# Patient Record
Sex: MALE | Race: OTHER | HISPANIC OR LATINO | URBAN - METROPOLITAN AREA
[De-identification: names, ages, dates, MRNs, and addresses within clinical notes are randomized per-mention and may not be internally consistent; named-entity substitution may affect disease eponyms.]

---

## 2018-01-11 ENCOUNTER — EMERGENCY (EMERGENCY)
Facility: HOSPITAL | Age: 28
LOS: 0 days | Discharge: HOME | End: 2018-01-12
Admitting: GENERAL PRACTICE
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
## 2018-01-11 DIAGNOSIS — Z79.82 LONG TERM (CURRENT) USE OF ASPIRIN: ICD-10-CM
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
## 2018-01-11 DIAGNOSIS — T31.0 BURNS INVOLVING LESS THAN 10% OF BODY SURFACE: ICD-10-CM

## 2018-01-11 DIAGNOSIS — T22.212A BURN OF SECOND DEGREE OF LEFT FOREARM, INITIAL ENCOUNTER: ICD-10-CM

## 2018-01-11 DIAGNOSIS — Z94.1 HEART TRANSPLANT STATUS: ICD-10-CM
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
## 2018-01-11 DIAGNOSIS — Z79.899 OTHER LONG TERM (CURRENT) DRUG THERAPY: ICD-10-CM

## 2018-01-11 DIAGNOSIS — Z79.52 LONG TERM (CURRENT) USE OF SYSTEMIC STEROIDS: ICD-10-CM
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
## 2018-05-22 ENCOUNTER — OUTPATIENT (OUTPATIENT)
Dept: OUTPATIENT SERVICES | Facility: HOSPITAL | Age: 28
LOS: 1 days | Discharge: HOME | End: 2018-05-22

## 2018-05-22 DIAGNOSIS — G35 MULTIPLE SCLEROSIS: ICD-10-CM

## 2018-07-31 ENCOUNTER — OUTPATIENT (OUTPATIENT)
Dept: OUTPATIENT SERVICES | Facility: HOSPITAL | Age: 28
LOS: 1 days | Discharge: HOME | End: 2018-07-31

## 2018-07-31 DIAGNOSIS — G35 MULTIPLE SCLEROSIS: ICD-10-CM

## 2019-06-22 ENCOUNTER — EMERGENCY (EMERGENCY)
Facility: HOSPITAL | Age: 29
LOS: 0 days | Discharge: HOME | End: 2019-06-22
Attending: EMERGENCY MEDICINE | Admitting: EMERGENCY MEDICINE
Payer: MEDICAID

## 2019-06-22 VITALS
RESPIRATION RATE: 18 BRPM | TEMPERATURE: 98 F | SYSTOLIC BLOOD PRESSURE: 110 MMHG | DIASTOLIC BLOOD PRESSURE: 70 MMHG | OXYGEN SATURATION: 97 % | HEART RATE: 96 BPM

## 2019-06-22 DIAGNOSIS — R10.9 UNSPECIFIED ABDOMINAL PAIN: ICD-10-CM

## 2019-06-22 DIAGNOSIS — Z94.1 HEART TRANSPLANT STATUS: Chronic | ICD-10-CM

## 2019-06-22 DIAGNOSIS — R19.7 DIARRHEA, UNSPECIFIED: ICD-10-CM

## 2019-06-22 LAB
ALBUMIN SERPL ELPH-MCNC: 4.1 G/DL — SIGNIFICANT CHANGE UP (ref 3.5–5.2)
ALP SERPL-CCNC: 61 U/L — SIGNIFICANT CHANGE UP (ref 30–115)
ALT FLD-CCNC: 42 U/L — HIGH (ref 0–41)
ANION GAP SERPL CALC-SCNC: 14 MMOL/L — SIGNIFICANT CHANGE UP (ref 7–14)
APPEARANCE UR: ABNORMAL
AST SERPL-CCNC: 48 U/L — HIGH (ref 0–41)
BASE EXCESS BLDV CALC-SCNC: 2.8 MMOL/L — HIGH (ref -2–2)
BASOPHILS # BLD AUTO: 0 K/UL — SIGNIFICANT CHANGE UP (ref 0–0.2)
BASOPHILS NFR BLD AUTO: 0 % — SIGNIFICANT CHANGE UP (ref 0–1)
BILIRUB SERPL-MCNC: 1.2 MG/DL — SIGNIFICANT CHANGE UP (ref 0.2–1.2)
BILIRUB UR-MCNC: ABNORMAL
BUN SERPL-MCNC: 13 MG/DL — SIGNIFICANT CHANGE UP (ref 10–20)
CA-I SERPL-SCNC: 1.19 MMOL/L — SIGNIFICANT CHANGE UP (ref 1.12–1.3)
CALCIUM SERPL-MCNC: 9.3 MG/DL — SIGNIFICANT CHANGE UP (ref 8.5–10.1)
CHLORIDE SERPL-SCNC: 103 MMOL/L — SIGNIFICANT CHANGE UP (ref 98–110)
CO2 SERPL-SCNC: 25 MMOL/L — SIGNIFICANT CHANGE UP (ref 17–32)
COLOR SPEC: ABNORMAL
CREAT SERPL-MCNC: <0.5 MG/DL — LOW (ref 0.7–1.5)
DIFF PNL FLD: ABNORMAL
EOSINOPHIL # BLD AUTO: 0.32 K/UL — SIGNIFICANT CHANGE UP (ref 0–0.7)
EOSINOPHIL NFR BLD AUTO: 4.4 % — SIGNIFICANT CHANGE UP (ref 0–8)
GAS PNL BLDV: 142 MMOL/L — SIGNIFICANT CHANGE UP (ref 136–145)
GAS PNL BLDV: SIGNIFICANT CHANGE UP
GLUCOSE SERPL-MCNC: 117 MG/DL — HIGH (ref 70–99)
GLUCOSE UR QL: NEGATIVE MG/DL — SIGNIFICANT CHANGE UP
HCO3 BLDV-SCNC: 27 MMOL/L — SIGNIFICANT CHANGE UP (ref 22–29)
HCT VFR BLD CALC: 47.1 % — SIGNIFICANT CHANGE UP (ref 42–52)
HCT VFR BLDA CALC: 48 % — HIGH (ref 34–44)
HGB BLD CALC-MCNC: 15.7 G/DL — SIGNIFICANT CHANGE UP (ref 14–18)
HGB BLD-MCNC: 15.3 G/DL — SIGNIFICANT CHANGE UP (ref 14–18)
IMM GRANULOCYTES NFR BLD AUTO: 0.4 % — HIGH (ref 0.1–0.3)
KETONES UR-MCNC: ABNORMAL
LACTATE BLDV-MCNC: 2 MMOL/L — HIGH (ref 0.5–1.6)
LEUKOCYTE ESTERASE UR-ACNC: NEGATIVE — SIGNIFICANT CHANGE UP
LYMPHOCYTES # BLD AUTO: 1.51 K/UL — SIGNIFICANT CHANGE UP (ref 1.2–3.4)
LYMPHOCYTES # BLD AUTO: 21 % — SIGNIFICANT CHANGE UP (ref 20.5–51.1)
MCHC RBC-ENTMCNC: 28.9 PG — SIGNIFICANT CHANGE UP (ref 27–31)
MCHC RBC-ENTMCNC: 32.5 G/DL — SIGNIFICANT CHANGE UP (ref 32–37)
MCV RBC AUTO: 89 FL — SIGNIFICANT CHANGE UP (ref 80–94)
MONOCYTES # BLD AUTO: 0.71 K/UL — HIGH (ref 0.1–0.6)
MONOCYTES NFR BLD AUTO: 9.9 % — HIGH (ref 1.7–9.3)
NEUTROPHILS # BLD AUTO: 4.63 K/UL — SIGNIFICANT CHANGE UP (ref 1.4–6.5)
NEUTROPHILS NFR BLD AUTO: 64.3 % — SIGNIFICANT CHANGE UP (ref 42.2–75.2)
NITRITE UR-MCNC: NEGATIVE — SIGNIFICANT CHANGE UP
NRBC # BLD: 0 /100 WBCS — SIGNIFICANT CHANGE UP (ref 0–0)
PCO2 BLDV: 41 MMHG — SIGNIFICANT CHANGE UP (ref 41–51)
PH BLDV: 7.43 — SIGNIFICANT CHANGE UP (ref 7.26–7.43)
PH UR: 5.5 — SIGNIFICANT CHANGE UP (ref 5–8)
PLATELET # BLD AUTO: 262 K/UL — SIGNIFICANT CHANGE UP (ref 130–400)
PO2 BLDV: 39 MMHG — SIGNIFICANT CHANGE UP (ref 20–40)
POTASSIUM BLDV-SCNC: 4 MMOL/L — SIGNIFICANT CHANGE UP (ref 3.3–5.6)
POTASSIUM SERPL-MCNC: 4.8 MMOL/L — SIGNIFICANT CHANGE UP (ref 3.5–5)
POTASSIUM SERPL-SCNC: 4.8 MMOL/L — SIGNIFICANT CHANGE UP (ref 3.5–5)
PROT SERPL-MCNC: 6.7 G/DL — SIGNIFICANT CHANGE UP (ref 6–8)
PROT UR-MCNC: 30 MG/DL
RBC # BLD: 5.29 M/UL — SIGNIFICANT CHANGE UP (ref 4.7–6.1)
RBC # FLD: 13.3 % — SIGNIFICANT CHANGE UP (ref 11.5–14.5)
RBC CASTS # UR COMP ASSIST: SIGNIFICANT CHANGE UP /HPF
SAO2 % BLDV: 71 % — SIGNIFICANT CHANGE UP
SODIUM SERPL-SCNC: 142 MMOL/L — SIGNIFICANT CHANGE UP (ref 135–146)
SP GR SPEC: 1.02 — SIGNIFICANT CHANGE UP (ref 1.01–1.03)
UROBILINOGEN FLD QL: 2 MG/DL (ref 0.2–0.2)
WBC # BLD: 7.2 K/UL — SIGNIFICANT CHANGE UP (ref 4.8–10.8)
WBC # FLD AUTO: 7.2 K/UL — SIGNIFICANT CHANGE UP (ref 4.8–10.8)

## 2019-06-22 PROCEDURE — 99284 EMERGENCY DEPT VISIT MOD MDM: CPT

## 2019-06-22 PROCEDURE — 93010 ELECTROCARDIOGRAM REPORT: CPT

## 2019-06-22 RX ORDER — SODIUM CHLORIDE 9 MG/ML
1000 INJECTION, SOLUTION INTRAVENOUS ONCE
Refills: 0 | Status: COMPLETED | OUTPATIENT
Start: 2019-06-22 | End: 2019-06-22

## 2019-06-22 RX ADMIN — SODIUM CHLORIDE 1000 MILLILITER(S): 9 INJECTION, SOLUTION INTRAVENOUS at 21:40

## 2019-06-22 RX ADMIN — SODIUM CHLORIDE 1000 MILLILITER(S): 9 INJECTION, SOLUTION INTRAVENOUS at 23:21

## 2019-06-22 NOTE — ED PROVIDER NOTE - OBJECTIVE STATEMENT
29 y/o M w/ PMHx Beckers Muscular Dystrophy, heart transplant (2010; on mycophenolate, tacrolimus) presents to the ED c/o intermittent fevers, chills, fatigue, nausea, and diarrhea for 5 weeks. Pt states that yesterday he had another episode of diarrhea described as loose stool and fatigue which prompted him to come to the ED today. He states that for the past week his brother has been complaining of diarrhea as well. He has otherwise been tolerating po. Pt denies abd pain, dysuria, hematuria, hematochezia, back pain, chest pain, SOB, recent travel, recent Abx use, or any other acute complaints. 29 y/o M w/ PMHx Beckers Muscular Dystrophy, heart transplant (2010; on mycophenolate, tacrolimus) presents to the ED c/o intermittent fevers, chills, fatigue, nausea, and diarrhea for 5 weeks. Pt states that yesterday he had an episode of diarrhea and fatigue which prompted him to come to the ED today. He states that for the past week his brother has been complaining of diarrhea as well. He has otherwise been tolerating po. Pt denies abd pain, dysuria, hematuria, hematochezia, back pain, chest pain, SOB, recent travel, recent Abx use.

## 2019-06-22 NOTE — ED PROVIDER NOTE - NS ED ROS FT
Constitutional: (+) fever, (+) chills, (+) fatigue  Eyes/ENT: (-) blurry vision, (-) epistaxis  Cardiovascular: (-) chest pain, (-) syncope  Respiratory: (-) cough, (-) shortness of breath  Gastrointestinal: (+) nausea, (-) vomiting, (+) diarrhea, (-) hematochezia  Genitourinary: (-) dysuria, (-) urgency, (-) hematuria  Musculoskeletal: (-) back pain, (-) joint pain  Integumentary: (-) rash, (-) edema  Neurological: (-) headache  Allergic/Immunologic: (-) pruritus Constitutional: (+) fever, (+) chills, (+) fatigue  Eyes/ENT: (-) eye discharge, (-) epistaxis  Cardiovascular: (-) chest pain, (-) syncope  Respiratory: (-) cough, (-) shortness of breath  Gastrointestinal: (+) nausea, (-) vomiting, (+) diarrhea, (-) hematochezia  Genitourinary: (-) dysuria, (-) urgency, (-) hematuria  Musculoskeletal: (-) back pain, (-) joint pain  Integumentary: (-) edema  Neurological: (-) headache  Allergic/Immunologic: (-) pruritus

## 2019-06-22 NOTE — ED ADULT NURSE NOTE - OBJECTIVE STATEMENT
Patient presents with lower abdominal pain, nausea, and diarrhea. Patient states that he has been having diarrhea for the past 5 weeks on and off. Hx of muscular dystrophy and heart transplant.

## 2019-06-22 NOTE — ED PROVIDER NOTE - CLINICAL SUMMARY MEDICAL DECISION MAKING FREE TEXT BOX
VS reviewed. Patient extremely well appearing.  Patient has absolutely no abdominal tenderness.  He is afebrile. IV placed and labs sent. Patient given fluids and GI meds.  He is tolerating PO. ED work up reviewed.  I do not think patient requires a Ct scan at this time as he is completely non-tender and labs unremarkable. Patient has very close follow up and able to return with any changes in status. Strict return precautions discussed.    Full DC instructions discussed and patient knows when to seek immediate medical attention.  Patient has proper follow up.  All results discussed and patient aware they may require further work up.  Proper follow up ensured. Limitations of ED work up discussed.  Medications administered and prescribed/OTC home meds discussed.  All questions and concerns from patient or family addressed. Understanding of instructions verbalized.

## 2019-06-22 NOTE — ED PROVIDER NOTE - NSFOLLOWUPINSTRUCTIONS_ED_ALL_ED_FT
Follow up with your primary care doctor, transplant specialist and/or the doctors recommended in 1-3 days     Diarrhea    Diarrhea is frequent loose or watery bowel movements that has many causes. Diarrhea can make you feel weak and cause you to become dehydrated. Diarrhea typically lasts 2–3 days, but can last longer if it is a sign of something more serious. Drink clear fluids to prevent dehydration. Eat bland, easy-to-digest foods as tolerated.     SEEK IMMEDIATE MEDICAL CARE IF YOU HAVE ANY OF THE FOLLOWING SYMPTOMS: high fevers, lightheadedness/dizziness, chest pain, black or bloody stools, shortness of breath, severe abdominal or back pain, or any signs of dehydration.

## 2019-06-22 NOTE — ED PROVIDER NOTE - PHYSICAL EXAMINATION
Vital Signs: I have reviewed the initial vital signs.  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  Constitutional: appears stated age, no acute distress, sitting in wheelchair  EYES: PERRL, EOM, no conjunctival erythema  EENT: dry mucous membranes, no nasal discharge  Cardiovascular: regular rate, regular rhythm, well-perfused extremities +s1,s2  Respiratory: unlabored respiratory effort, CTABL  Gastrointestinal: soft, ntnd abdomen, no pulsatile mass, no rebound, no guarding, no rigidity   Musculoskeletal: no lower extremity edema  Integumentary: warm, dry, no rash  Neurologic AOx 3, poor neurologic status chronic 2/2 dm  Psychiatric: appropriate mood, appropriate affect

## 2019-06-22 NOTE — ED PROVIDER NOTE - ATTENDING CONTRIBUTION TO CARE
I personally evaluated patient. I agree with the findings and plan with all documentation on chart except as documented  in my note.    29 y/o M w/ PMHx Beckers Muscular Dystrophy, heart transplant (2010; on mycophenolate, tacrolimus) presents to the ED c/o intermittent fevers, chills, fatigue, nausea, and diarrhea for 5 weeks. Pt states that yesterday he had an episode of diarrhea and fatigue which prompted him to come to the ED today. He states that for the past week his brother has been complaining of diarrhea as well. He has otherwise been tolerating po. Pt denies abd pain, dysuria, hematuria, hematochezia, back pain, chest pain, SOB, recent travel, recent Abx use.    VS reviewed. Patient extremely well appearing.  Patient has absolutely no abdominal tenderness.  He is afebrile. IV placed and labs sent. Patient given fluids and GI meds.  He is tolerating PO. ED work up reviewed.  I do not think patient requires a Ct scan at this time as he is completely non-tender and labs unremarkable. Patient has very close follow up and able to return with any changes in status. Strict return precautions discussed.    Full DC instructions discussed and patient knows when to seek immediate medical attention.  Patient has proper follow up.  All results discussed and patient aware they may require further work up.  Proper follow up ensured. Limitations of ED work up discussed.  Medications administered and prescribed/OTC home meds discussed.  All questions and concerns from patient or family addressed. Understanding of instructions verbalized. I agree with both resident and medical student documentation.    I personally evaluated patient. I agree with the findings and plan with all documentation on chart except as documented  in my note.    29 y/o M w/ PMHx Beckers Muscular Dystrophy, heart transplant (2010; on mycophenolate, tacrolimus) presents to the ED c/o intermittent fevers, chills, fatigue, nausea, and diarrhea for 5 weeks. Pt states that yesterday he had an episode of diarrhea and fatigue which prompted him to come to the ED today. He states that for the past week his brother has been complaining of diarrhea as well. He has otherwise been tolerating po. Pt denies abd pain, dysuria, hematuria, hematochezia, back pain, chest pain, SOB, recent travel, recent Abx use.    VS reviewed. Patient extremely well appearing.  Patient has absolutely no abdominal tenderness.  He is afebrile. IV placed and labs sent. Patient given fluids and GI meds.  He is tolerating PO. ED work up reviewed.  I do not think patient requires a Ct scan at this time as he is completely non-tender and labs unremarkable. Patient has very close follow up and able to return with any changes in status. Strict return precautions discussed.    Full DC instructions discussed and patient knows when to seek immediate medical attention.  Patient has proper follow up.  All results discussed and patient aware they may require further work up.  Proper follow up ensured. Limitations of ED work up discussed.  Medications administered and prescribed/OTC home meds discussed.  All questions and concerns from patient or family addressed. Understanding of instructions verbalized.

## 2019-06-22 NOTE — ED PROVIDER NOTE - PROGRESS NOTE DETAILS
zw sdm with patient, no ct at this time, understands return pecautions. pt reevaluated, feels better. discussed return precautions and need for close follow up with pmd and transplant doctor. Patient to be discharged from ED. Any available test results were discussed with patient and/or family. Verbal instructions given, including instructions to return to ED immediately for any new, worsening, or concerning symptoms. Patient endorsed understanding. Written discharge instructions additionally given, including follow-up plan.

## 2019-06-27 ENCOUNTER — INPATIENT (INPATIENT)
Facility: HOSPITAL | Age: 29
LOS: 1 days | Discharge: HOME | End: 2019-06-29
Attending: INTERNAL MEDICINE | Admitting: INTERNAL MEDICINE
Payer: MEDICAID

## 2019-06-27 VITALS
WEIGHT: 149.91 LBS | HEART RATE: 77 BPM | TEMPERATURE: 96 F | OXYGEN SATURATION: 99 % | RESPIRATION RATE: 18 BRPM | DIASTOLIC BLOOD PRESSURE: 61 MMHG | SYSTOLIC BLOOD PRESSURE: 100 MMHG

## 2019-06-27 DIAGNOSIS — R53.1 WEAKNESS: ICD-10-CM

## 2019-06-27 DIAGNOSIS — M79.10 MYALGIA, UNSPECIFIED SITE: ICD-10-CM

## 2019-06-27 DIAGNOSIS — R07.9 CHEST PAIN, UNSPECIFIED: ICD-10-CM

## 2019-06-27 DIAGNOSIS — G71.01 DUCHENNE OR BECKER MUSCULAR DYSTROPHY: ICD-10-CM

## 2019-06-27 DIAGNOSIS — F12.90 CANNABIS USE, UNSPECIFIED, UNCOMPLICATED: ICD-10-CM

## 2019-06-27 DIAGNOSIS — T43.95XA ADVERSE EFFECT OF UNSPECIFIED PSYCHOTROPIC DRUG, INITIAL ENCOUNTER: ICD-10-CM

## 2019-06-27 DIAGNOSIS — F17.210 NICOTINE DEPENDENCE, CIGARETTES, UNCOMPLICATED: ICD-10-CM

## 2019-06-27 DIAGNOSIS — Z79.82 LONG TERM (CURRENT) USE OF ASPIRIN: ICD-10-CM

## 2019-06-27 DIAGNOSIS — B34.9 VIRAL INFECTION, UNSPECIFIED: ICD-10-CM

## 2019-06-27 DIAGNOSIS — Z79.899 OTHER LONG TERM (CURRENT) DRUG THERAPY: ICD-10-CM

## 2019-06-27 DIAGNOSIS — Z94.1 HEART TRANSPLANT STATUS: ICD-10-CM

## 2019-06-27 DIAGNOSIS — R18.8 OTHER ASCITES: ICD-10-CM

## 2019-06-27 DIAGNOSIS — Z94.1 HEART TRANSPLANT STATUS: Chronic | ICD-10-CM

## 2019-06-27 DIAGNOSIS — F32.9 MAJOR DEPRESSIVE DISORDER, SINGLE EPISODE, UNSPECIFIED: ICD-10-CM

## 2019-06-27 DIAGNOSIS — K82.8 OTHER SPECIFIED DISEASES OF GALLBLADDER: ICD-10-CM

## 2019-06-27 DIAGNOSIS — R74.0 NONSPECIFIC ELEVATION OF LEVELS OF TRANSAMINASE AND LACTIC ACID DEHYDROGENASE [LDH]: ICD-10-CM

## 2019-06-27 DIAGNOSIS — R11.0 NAUSEA: ICD-10-CM

## 2019-06-27 LAB
ALBUMIN SERPL ELPH-MCNC: 4 G/DL — SIGNIFICANT CHANGE UP (ref 3.5–5.2)
ALP SERPL-CCNC: 54 U/L — SIGNIFICANT CHANGE UP (ref 30–115)
ALT FLD-CCNC: 44 U/L — HIGH (ref 0–41)
ANION GAP SERPL CALC-SCNC: 17 MMOL/L — HIGH (ref 7–14)
AST SERPL-CCNC: 68 U/L — HIGH (ref 0–41)
BASE EXCESS BLDV CALC-SCNC: -1.7 MMOL/L — SIGNIFICANT CHANGE UP (ref -2–2)
BILIRUB SERPL-MCNC: 1.3 MG/DL — HIGH (ref 0.2–1.2)
BUN SERPL-MCNC: 20 MG/DL — SIGNIFICANT CHANGE UP (ref 10–20)
CA-I SERPL-SCNC: 1.12 MMOL/L — SIGNIFICANT CHANGE UP (ref 1.12–1.3)
CALCIUM SERPL-MCNC: 9.8 MG/DL — SIGNIFICANT CHANGE UP (ref 8.5–10.1)
CHLORIDE SERPL-SCNC: 97 MMOL/L — LOW (ref 98–110)
CK MB CFR SERPL CALC: 45.6 NG/ML — HIGH (ref 0.6–6.3)
CK SERPL-CCNC: 1826 U/L — HIGH (ref 0–225)
CK SERPL-CCNC: 2339 U/L — HIGH (ref 0–225)
CO2 SERPL-SCNC: 23 MMOL/L — SIGNIFICANT CHANGE UP (ref 17–32)
CREAT SERPL-MCNC: <0.5 MG/DL — LOW (ref 0.7–1.5)
GAS PNL BLDV: 136 MMOL/L — SIGNIFICANT CHANGE UP (ref 136–145)
GAS PNL BLDV: SIGNIFICANT CHANGE UP
GLUCOSE SERPL-MCNC: 101 MG/DL — HIGH (ref 70–99)
HCO3 BLDV-SCNC: 24 MMOL/L — SIGNIFICANT CHANGE UP (ref 22–29)
HCT VFR BLD CALC: 43.5 % — SIGNIFICANT CHANGE UP (ref 42–52)
HCT VFR BLDA CALC: 45.4 % — HIGH (ref 34–44)
HGB BLD CALC-MCNC: 14.8 G/DL — SIGNIFICANT CHANGE UP (ref 14–18)
HGB BLD-MCNC: 14.1 G/DL — SIGNIFICANT CHANGE UP (ref 14–18)
INR BLD: 1.43 RATIO — HIGH (ref 0.65–1.3)
LACTATE BLDV-MCNC: 1.9 MMOL/L — HIGH (ref 0.5–1.6)
LACTATE SERPL-SCNC: 2.8 MMOL/L — HIGH (ref 0.5–2.2)
LIDOCAIN IGE QN: 11 U/L — SIGNIFICANT CHANGE UP (ref 7–60)
MAGNESIUM SERPL-MCNC: 1.5 MG/DL — LOW (ref 1.8–2.4)
MCHC RBC-ENTMCNC: 28.7 PG — SIGNIFICANT CHANGE UP (ref 27–31)
MCHC RBC-ENTMCNC: 32.4 G/DL — SIGNIFICANT CHANGE UP (ref 32–37)
MCV RBC AUTO: 88.4 FL — SIGNIFICANT CHANGE UP (ref 80–94)
NRBC # BLD: 0 /100 WBCS — SIGNIFICANT CHANGE UP (ref 0–0)
NT-PROBNP SERPL-SCNC: 3464 PG/ML — HIGH (ref 0–300)
PCO2 BLDV: 41 MMHG — SIGNIFICANT CHANGE UP (ref 41–51)
PH BLDV: 7.37 — SIGNIFICANT CHANGE UP (ref 7.26–7.43)
PLATELET # BLD AUTO: 260 K/UL — SIGNIFICANT CHANGE UP (ref 130–400)
PO2 BLDV: 34 MMHG — SIGNIFICANT CHANGE UP (ref 20–40)
POTASSIUM BLDV-SCNC: 3.9 MMOL/L — SIGNIFICANT CHANGE UP (ref 3.3–5.6)
POTASSIUM SERPL-MCNC: 4.4 MMOL/L — SIGNIFICANT CHANGE UP (ref 3.5–5)
POTASSIUM SERPL-SCNC: 4.4 MMOL/L — SIGNIFICANT CHANGE UP (ref 3.5–5)
PROT SERPL-MCNC: 6.4 G/DL — SIGNIFICANT CHANGE UP (ref 6–8)
PROTHROM AB SERPL-ACNC: 16.4 SEC — HIGH (ref 9.95–12.87)
RBC # BLD: 4.92 M/UL — SIGNIFICANT CHANGE UP (ref 4.7–6.1)
RBC # FLD: 13.6 % — SIGNIFICANT CHANGE UP (ref 11.5–14.5)
SAO2 % BLDV: 55 % — SIGNIFICANT CHANGE UP
SODIUM SERPL-SCNC: 137 MMOL/L — SIGNIFICANT CHANGE UP (ref 135–146)
TROPONIN T SERPL-MCNC: 0.39 NG/ML — CRITICAL HIGH
TROPONIN T SERPL-MCNC: 0.47 NG/ML — CRITICAL HIGH
WBC # BLD: 9.51 K/UL — SIGNIFICANT CHANGE UP (ref 4.8–10.8)
WBC # FLD AUTO: 9.51 K/UL — SIGNIFICANT CHANGE UP (ref 4.8–10.8)

## 2019-06-27 PROCEDURE — 99291 CRITICAL CARE FIRST HOUR: CPT

## 2019-06-27 PROCEDURE — 71045 X-RAY EXAM CHEST 1 VIEW: CPT | Mod: 26

## 2019-06-27 PROCEDURE — 93010 ELECTROCARDIOGRAM REPORT: CPT | Mod: 77

## 2019-06-27 PROCEDURE — 99222 1ST HOSP IP/OBS MODERATE 55: CPT

## 2019-06-27 PROCEDURE — 93010 ELECTROCARDIOGRAM REPORT: CPT

## 2019-06-27 PROCEDURE — 76705 ECHO EXAM OF ABDOMEN: CPT | Mod: 26

## 2019-06-27 RX ORDER — MYCOPHENOLATE MOFETIL 250 MG/1
3 CAPSULE ORAL
Qty: 0 | Refills: 0 | DISCHARGE

## 2019-06-27 RX ORDER — ESCITALOPRAM OXALATE 10 MG/1
10 TABLET, FILM COATED ORAL DAILY
Refills: 0 | Status: DISCONTINUED | OUTPATIENT
Start: 2019-06-27 | End: 2019-06-28

## 2019-06-27 RX ORDER — ERGOCALCIFEROL 1.25 MG/1
50000 CAPSULE ORAL
Refills: 0 | Status: DISCONTINUED | OUTPATIENT
Start: 2019-06-27 | End: 2019-06-29

## 2019-06-27 RX ORDER — MAGNESIUM SULFATE 500 MG/ML
2 VIAL (ML) INJECTION ONCE
Refills: 0 | Status: COMPLETED | OUTPATIENT
Start: 2019-06-27 | End: 2019-06-27

## 2019-06-27 RX ORDER — PANTOPRAZOLE SODIUM 20 MG/1
40 TABLET, DELAYED RELEASE ORAL
Refills: 0 | Status: DISCONTINUED | OUTPATIENT
Start: 2019-06-27 | End: 2019-06-29

## 2019-06-27 RX ORDER — ASPIRIN/CALCIUM CARB/MAGNESIUM 324 MG
1 TABLET ORAL
Qty: 0 | Refills: 0 | DISCHARGE

## 2019-06-27 RX ORDER — TACROLIMUS 5 MG/1
3 CAPSULE ORAL
Qty: 0 | Refills: 0 | DISCHARGE

## 2019-06-27 RX ORDER — ASPIRIN/CALCIUM CARB/MAGNESIUM 324 MG
81 TABLET ORAL DAILY
Refills: 0 | Status: DISCONTINUED | OUTPATIENT
Start: 2019-06-27 | End: 2019-06-29

## 2019-06-27 RX ORDER — TACROLIMUS 5 MG/1
4 CAPSULE ORAL
Qty: 0 | Refills: 0 | DISCHARGE

## 2019-06-27 RX ORDER — TACROLIMUS 5 MG/1
0.5 CAPSULE ORAL AT BEDTIME
Refills: 0 | Status: DISCONTINUED | OUTPATIENT
Start: 2019-06-27 | End: 2019-06-29

## 2019-06-27 RX ORDER — MYCOPHENOLATE MOFETIL 250 MG/1
1 CAPSULE ORAL
Qty: 0 | Refills: 0 | DISCHARGE

## 2019-06-27 RX ORDER — MYCOPHENOLATE MOFETIL 250 MG/1
750 CAPSULE ORAL AT BEDTIME
Refills: 0 | Status: DISCONTINUED | OUTPATIENT
Start: 2019-06-27 | End: 2019-06-29

## 2019-06-27 RX ORDER — PANTOPRAZOLE SODIUM 20 MG/1
30 TABLET, DELAYED RELEASE ORAL
Refills: 0 | Status: DISCONTINUED | OUTPATIENT
Start: 2019-06-27 | End: 2019-06-27

## 2019-06-27 RX ORDER — ENOXAPARIN SODIUM 100 MG/ML
40 INJECTION SUBCUTANEOUS AT BEDTIME
Refills: 0 | Status: DISCONTINUED | OUTPATIENT
Start: 2019-06-27 | End: 2019-06-29

## 2019-06-27 RX ORDER — ATENOLOL 25 MG/1
1 TABLET ORAL
Qty: 0 | Refills: 0 | DISCHARGE

## 2019-06-27 RX ORDER — ATENOLOL 25 MG/1
25 TABLET ORAL DAILY
Refills: 0 | Status: DISCONTINUED | OUTPATIENT
Start: 2019-06-27 | End: 2019-06-29

## 2019-06-27 RX ORDER — CHLORHEXIDINE GLUCONATE 213 G/1000ML
1 SOLUTION TOPICAL
Refills: 0 | Status: DISCONTINUED | OUTPATIENT
Start: 2019-06-27 | End: 2019-06-29

## 2019-06-27 RX ORDER — ONDANSETRON 8 MG/1
4 TABLET, FILM COATED ORAL EVERY 8 HOURS
Refills: 0 | Status: DISCONTINUED | OUTPATIENT
Start: 2019-06-27 | End: 2019-06-27

## 2019-06-27 RX ORDER — MYCOPHENOLATE MOFETIL 250 MG/1
500 CAPSULE ORAL
Refills: 0 | Status: DISCONTINUED | OUTPATIENT
Start: 2019-06-27 | End: 2019-06-29

## 2019-06-27 RX ORDER — ONDANSETRON 8 MG/1
4 TABLET, FILM COATED ORAL ONCE
Refills: 0 | Status: COMPLETED | OUTPATIENT
Start: 2019-06-27 | End: 2019-06-27

## 2019-06-27 RX ORDER — TACROLIMUS 5 MG/1
4 CAPSULE ORAL
Refills: 0 | Status: DISCONTINUED | OUTPATIENT
Start: 2019-06-27 | End: 2019-06-29

## 2019-06-27 RX ORDER — MYCOPHENOLATE MOFETIL 250 MG/1
2 CAPSULE ORAL
Qty: 0 | Refills: 0 | DISCHARGE

## 2019-06-27 RX ORDER — TACROLIMUS 5 MG/1
3 CAPSULE ORAL AT BEDTIME
Refills: 0 | Status: DISCONTINUED | OUTPATIENT
Start: 2019-06-27 | End: 2019-06-29

## 2019-06-27 RX ORDER — UBIDECARENONE 100 MG
1 CAPSULE ORAL
Qty: 0 | Refills: 0 | DISCHARGE

## 2019-06-27 RX ORDER — SODIUM CHLORIDE 9 MG/ML
1000 INJECTION INTRAMUSCULAR; INTRAVENOUS; SUBCUTANEOUS
Refills: 0 | Status: DISCONTINUED | OUTPATIENT
Start: 2019-06-27 | End: 2019-06-29

## 2019-06-27 RX ORDER — SODIUM CHLORIDE 9 MG/ML
1000 INJECTION INTRAMUSCULAR; INTRAVENOUS; SUBCUTANEOUS ONCE
Refills: 0 | Status: COMPLETED | OUTPATIENT
Start: 2019-06-27 | End: 2019-06-27

## 2019-06-27 RX ORDER — ASPIRIN/CALCIUM CARB/MAGNESIUM 324 MG
325 TABLET ORAL ONCE
Refills: 0 | Status: COMPLETED | OUTPATIENT
Start: 2019-06-27 | End: 2019-06-27

## 2019-06-27 RX ORDER — TACROLIMUS 5 MG/1
0.5 CAPSULE ORAL
Qty: 0 | Refills: 0 | DISCHARGE

## 2019-06-27 RX ORDER — ACETAMINOPHEN 500 MG
2 TABLET ORAL
Qty: 0 | Refills: 0 | DISCHARGE

## 2019-06-27 RX ORDER — ACETAMINOPHEN 500 MG
500 TABLET ORAL EVERY 8 HOURS
Refills: 0 | Status: DISCONTINUED | OUTPATIENT
Start: 2019-06-27 | End: 2019-06-29

## 2019-06-27 RX ORDER — ERGOCALCIFEROL 1.25 MG/1
1 CAPSULE ORAL
Qty: 0 | Refills: 0 | DISCHARGE

## 2019-06-27 RX ADMIN — Medication 2 GRAM(S): at 07:27

## 2019-06-27 RX ADMIN — ONDANSETRON 4 MILLIGRAM(S): 8 TABLET, FILM COATED ORAL at 04:20

## 2019-06-27 RX ADMIN — TACROLIMUS 4 MILLIGRAM(S): 5 CAPSULE ORAL at 12:26

## 2019-06-27 RX ADMIN — TACROLIMUS 3 MILLIGRAM(S): 5 CAPSULE ORAL at 21:37

## 2019-06-27 RX ADMIN — MYCOPHENOLATE MOFETIL 750 MILLIGRAM(S): 250 CAPSULE ORAL at 21:38

## 2019-06-27 RX ADMIN — Medication 50 GRAM(S): at 05:48

## 2019-06-27 RX ADMIN — SODIUM CHLORIDE 1000 MILLILITER(S): 9 INJECTION INTRAMUSCULAR; INTRAVENOUS; SUBCUTANEOUS at 05:55

## 2019-06-27 RX ADMIN — MYCOPHENOLATE MOFETIL 500 MILLIGRAM(S): 250 CAPSULE ORAL at 12:26

## 2019-06-27 RX ADMIN — Medication 81 MILLIGRAM(S): at 12:26

## 2019-06-27 RX ADMIN — ENOXAPARIN SODIUM 40 MILLIGRAM(S): 100 INJECTION SUBCUTANEOUS at 21:38

## 2019-06-27 RX ADMIN — SODIUM CHLORIDE 100 MILLILITER(S): 9 INJECTION INTRAMUSCULAR; INTRAVENOUS; SUBCUTANEOUS at 14:12

## 2019-06-27 RX ADMIN — TACROLIMUS 0.5 MILLIGRAM(S): 5 CAPSULE ORAL at 21:38

## 2019-06-27 RX ADMIN — PANTOPRAZOLE SODIUM 40 MILLIGRAM(S): 20 TABLET, DELAYED RELEASE ORAL at 12:27

## 2019-06-27 RX ADMIN — SODIUM CHLORIDE 1000 MILLILITER(S): 9 INJECTION INTRAMUSCULAR; INTRAVENOUS; SUBCUTANEOUS at 03:55

## 2019-06-27 RX ADMIN — Medication 325 MILLIGRAM(S): at 07:41

## 2019-06-27 RX ADMIN — ESCITALOPRAM OXALATE 10 MILLIGRAM(S): 10 TABLET, FILM COATED ORAL at 21:37

## 2019-06-27 NOTE — ED PROVIDER NOTE - NS ED ROS FT
Constitutional: See HPI.  Eyes: No visual changes,   ENMT:  No neck pain  Cardiac: see hpi  Respiratory: No cough o  GI: No nausea, vomiting,+ diarrhea no abdominal pain.  : No dysuria, frequency or burning.   MS: +myalgias  Psych: No suicidal or homicidal ideations.  Neuro: No headache   Skin: No skin rash.

## 2019-06-27 NOTE — CHART NOTE - NSCHARTNOTEFT_GEN_A_CORE
Called by Pharmacy regarding possible drug interaction between Lexapro and tacrolimus affecting the liver. Endorsed to resident on call to clarify length of time patient had been on both medications and to possibly change the Lexapro to alternate agent for patient's depression. Tacrolimus levels ordered for the AM. Called by Pharmacy regarding possible drug interaction between Lexapro and tacrolimus affecting the liver. Endorsed to resident on call to clarify length of time patient had been on both medications and to possibly change the Lexapro to alternate agent for patient's depression. Tacrolimus levels collected; pending.

## 2019-06-27 NOTE — H&P ADULT - HISTORY OF PRESENT ILLNESS
This is a 27 yo M w PMH significant for Beckers Muscular Dystrophy s/p heart transplant 2010 ([place]) and depression presenting to the emergency department for a 2 week history of intermittent fever, generalized weakness and malaise. Patient's symptoms all began around the same time; states his last fever was two days ago, and he feels tired without much energy. Since his transplant, he has never experienced these symptoms. He also endorses nausea, decreased appetite, nonbloody diarrhea, dizziness. On arrival, patient states there was a burning in his epigastric/sternal area, without radiation to neck or arm, and also complained of SOB at rest on arrival. Patient denies coughing, wheezing, palpitations, abdominal pain, or vomiting. No travel history or sick contacts; compliant with medications and states the only new change in medication treatment is the addition of escitalopram in March. Patient was here in the ED this Saturday for the same complaints and was d/c to home at that time.      In the ED, vital signs: HR  77, /61, T 96.4, SpO2 99% RA, RR 18. CBC CMP ordered. Trops 0.47. EKG performed:  Normal sinus rhythm, Rightward axis, Incomplete right bundle branch block, Nonspecific ST and T wave abnormality. Cardio consult was placed. This is a 27 yo M w PMH significant for Beckers Muscular Dystrophy s/p heart transplant 2010 ([place]) and depression presenting to the emergency department for a 2 week history of intermittent fever, generalized weakness and malaise. Patient's symptoms all began around the same time; states his last fever was two days ago, and he feels tired without much energy. Since his transplant, he has never experienced these symptoms. He also endorses nausea, decreased appetite, nonbloody diarrhea, dizziness. On arrival, patient states there was a burning in his epigastric/sternal area, without radiation to neck or arm, and also complained of SOB at rest on arrival. Patient denies coughing, wheezing, palpitations, abdominal pain, or vomiting. No travel history or sick contacts; compliant with medications and states the only new change in medication treatment is the addition of escitalopram in March. Patient was here in the ED this Saturday for the same complaints and was d/c to home at that time.      In the ED, vital signs: HR  77, /61, T 96.4, SpO2 99% RA, RR 18. CBC CMP ordered. Trops 0.47. EKG performed:  Normal sinus rhythm, Rightward axis, Incomplete right bundle branch block, Nonspecific ST and T wave abnormality. Cardio consult was placed.

## 2019-06-27 NOTE — SBIRT NOTE ADULT - NSSBIRTALCPOSREINDET_GEN_A_CORE
Reinforced positive choices and educated patient about NI[employer] low-risk drinking level and risks/harm reduction around excessive alcohol use.

## 2019-06-27 NOTE — ED PROVIDER NOTE - CLINICAL SUMMARY MEDICAL DECISION MAKING FREE TEXT BOX
Pt with hx of heart transplant with complaints of CP. EKG w/o acute changes. Trop elevated. Discussed with cardiology fellow who recommended admission to telemetry for further managment

## 2019-06-27 NOTE — ED PROVIDER NOTE - OBJECTIVE STATEMENT
28 year old male w/ a pmh of beckers muscular dystrophy s/p heart transplant in 2016 on tacrolimus prednisone and mycophenolate  & depression on lexapro presents here for multiple complaints. Patient has been having diarrhea once a day for the last 2 weeks endorses feeling dehydrated with body aches. Patient also has been having intermittent sob and cp x 1 week. No recent travel no recent surgeries, cardiologist is in [facility] next appointment is july 30 2019. Denies any fever cough abdominal pain urinary frequency urgency burning leg pain/swelling. 28 year old male w/ a pmh of beckers muscular dystrophy s/p heart transplant in 2010 on tacrolimus prednisone and mycophenolate  & depression on lexapro presents here for multiple complaints. Patient has been having diarrhea once a day for the last 2 weeks endorses feeling dehydrated with body aches. Patient also has been having intermittent sob and cp x 1 week. No recent travel no recent surgeries, cardiologist is in [facility] next appointment is july 30 2019. Denies any fever cough abdominal pain urinary frequency urgency burning leg pain/swelling.

## 2019-06-27 NOTE — ED ADULT NURSE NOTE - OBJECTIVE STATEMENT
pt came to ED reporting diarrhea and feeling "dehydrated" for 2 weeks, intermittent SOB and chest pain. placed on cardiac monitor

## 2019-06-27 NOTE — ED PROVIDER NOTE - ATTENDING CONTRIBUTION TO CARE
I personally evaluated the patient. I reviewed the Resident’s or Physician Assistant’s note (as assigned above), and agree with the findings and plan except as documented in my note.  Pt with hx of Vincent's muscular dystrophy, hx of heart transplant in 2010 with complaints of CP and SOB. CP is constant, no aggravating or alleviating factors. Pt also report diarrhea for the past 2 weeks. Associated with myalgias. No rhinorrhea, congestion, sore throat, nausea, vomiting. VS reviewed, pt well appearing, NAD. Head ncat, neck supple, no JVD, normal s1s2 without any murmurs, Lungs CTAB with normal work of breathing. abd +BS, s/nd/nt, extremities wnl, AAO x 3, CN II to XII wnl, motor strength in b/l UE 4/5 and 3/5 in b/l LE, no acute skin rashes. Plan is EKG, labs, monitor, cardiology consult, ASA and manage accordingly.

## 2019-06-27 NOTE — H&P ADULT - NSHPLABSRESULTS_GEN_ALL_CORE
14.1   9.51  )-----------( 260      ( 27 Jun 2019 04:00 )             43.5   06-27    137  |  97<L>  |  20  ----------------------------<  101<H>  4.4   |  23  |  <0.5<L>    Ca    9.8      27 Jun 2019 04:00  Mg     1.5     06-27    TPro  6.4  /  Alb  4.0  /  TBili  1.3<H>  /  DBili  x   /  AST  68<H>  /  ALT  44<H>  /  AlkPhos  54  06-27 14.1   9.51  )-----------( 260      ( 27 Jun 2019 04:00 )             43.5   06-27    137  |  97<L>  |  20  ----------------------------<  101<H>  4.4   |  23  |  <0.5<L>    Ca    9.8      27 Jun 2019 04:00  Mg     1.5     06-27    TPro  6.4  /  Alb  4.0  /  TBili  1.3<H>  /  DBili  x   /  AST  68<H>  /  ALT  44<H>  /  AlkPhos  54  06-27    Troponin T, Serum (06.27.19 @ 04:00)    Troponin T, Serum: 0.47

## 2019-06-27 NOTE — H&P ADULT - ASSESSMENT
This is a 29 yo M w PMH significant for Beckers Muscular Dystrophy s/p heart transplant 2010 ([place]) and depression presenting to the emergency department for a 2 week history of intermittent fever, generalized weakness and malaise.    #) Fever, weakness, diarrhea  - infectious vs. medication side effect  - Afebrile since yesterday; continue to monitor  - No leukocytosis present - continue to monitor  - Blood cx ordered  - Compliant with medications   - ID consult ordered    #) Elevated troponins  - No ACS  - Less likely rejection as pt has been compliant  - Admit to tele for 24 hr  - 0.47 troponin - continue to trend   - CKMB ordered  - FU tacrolimus and mycophenolate levels  - 2d echo  - IV fluids   - Follow up with transplant physician at [place]     #)Mild transaminitis  - RUQ US ordered  - Continue to follow     #) Depression  - Continue escitalopram    #) DVT ppx  -Lovenox ordered    #) GI ppx  -Not indicated    #) Dispo  - Arrived from home; d/c to home when medically stable    #) Code status  - Full code This is a 29 yo M w PMH significant for Beckers Muscular Dystrophy s/p heart transplant 2010 ([place]) and depression presenting to the emergency department for a 2 week history of intermittent fever, generalized weakness and malaise.    #) Fever, weakness, diarrhea  - infectious vs. medication side effect  - Zofran prn  - Afebrile since yesterday; continue to monitor  - No leukocytosis present - continue to monitor  - Blood cx ordered, urine culture ordered  - Compliant with medications   - ID consult pending    #) Elevated troponins  - No ACS  - Less likely rejection as pt has been compliant  - Admit to tele for 24 hr  - 0.47 troponin - continue to trend   - CKMB ordered  - FU tacrolimus and mycophenolate levels  - 2d echo ordered  - IV fluids 100cc/hr for 12 hr per cardio  - Follow up with transplant physician at [place]     #)Mild transaminitis  - RUQ US ordered  - Continue to follow     #) Depression  - Continue escitalopram    #) DVT ppx  -Lovenox ordered    #) GI ppx  -Protonix ordered qd for chronic steroid use    #) Dispo  - Arrived from home; d/c to home when medically stable    #) Code status  - Full code

## 2019-06-27 NOTE — H&P ADULT - NSICDXPASTSURGICALHX_GEN_ALL_CORE_FT
PAST SURGICAL HISTORY:  H/O heart transplant 2010    Status Post Biopsy muscle biopsy    Status Post Biopsy

## 2019-06-27 NOTE — H&P ADULT - ATTENDING COMMENTS
Patient seen and examined independently. Resident's H & P reviewed. Agree with the findings and plan of care except,    A 28 years old male presented to the ED with intermittent fever, generalized weakness and malaise for the last two weeks. Also C/O feeling tired and having no energy.    Troponin: 0.47 --> 0.39 --> 0.41  BNP: 3464  EKG: NSR @ 73/min. RAD, IRBBB, NS ST, T changes. (Interpreted by me.)  CXR: NAPD  RUQ US: GB sludge. Extensive GB wall thickening.    ASSESSMENT:    1. Fever / Generalized weakness  2. S/P Heart transplant  3. Vincent Muscular Dystrophy  4. Depression    PLAN:    . Cont tele  . Cardiology eval appreciated  . Pt is afebrile since admission  . Check cxs  . ID eval  . Cont his home meds Patient seen and examined independently. Resident's H & P reviewed. Agree with the findings and plan of care except,    A 28 years old male presented to the ED with intermittent fever, generalized weakness and malaise for the last two to three weeks. Also C/O feeling tired and having no energy. Pt also had diarrhea which now has resolved.     Troponin: 0.47 --> 0.39 --> 0.41  BNP: 3464  EKG: NSR @ 73/min. RAD, IRBBB, NS ST, T changes. (Interpreted by me.)  CXR: NAPD  RUQ US: GB sludge. Extensive GB wall thickening.  ECHO: NLVF. EF 64%    ASSESSMENT:    1. Fever / Generalized weakness  2. S/P Heart transplant  3. Vincent Muscular Dystrophy  4. Depression    PLAN:    . Cont tele  . Cardiology eval appreciated. Care D/W the cardiologist Dr. Mcgovern. No further cardiac W/U  . Check Cellcept and Prograf levels  . Pt is afebrile since admission  . Blood cx x 2 negative  . ID eval  . Cont his home meds  . Pt's elevated CK, Troponin, AST and ALT are likely due to muscle dystrophy.

## 2019-06-27 NOTE — CONSULT NOTE ADULT - SUBJECTIVE AND OBJECTIVE BOX
HPI:    28 yrs with PMH of vincent muscular dystrophy , CM s/p cardiac transplant in 2010 at [facility] ( unknwon donor) , depression presented for generalized weakness along with fever and chills for the last 2-3 weeks. pt started to have watery diarrhea , one episode per day associated with nausea, decrease appetite , and generalized weakness. he has been having intermittent fever ( highest was 103) , last time was febrile was 2 days ago .  no Upper resp symptoms , no urinary sx , no abd pain , no joint pain , no rash.  pt is compliant with his meds.  well followed for the transplant , had RV Bx 2 years ago , and cath in january this year ( Normal as per pt) , never had rejection.    PAST MEDICAL & SURGICAL HISTORY  Vincent Muscular Dystrophy  H/O heart transplant: 2010  Status Post Biopsy  Status Post Biopsy: muscle biopsy      SOCIAL HISTORY:  []smoker  []Alcohol  []Drug    ALLERGIES:  No Known Allergies      HOME MEDICATIONS:  Home Medications:  prednisone  mycophenolate  tacrolimus  lexapro      VITALS:   T(F): 96.4 (06-27 @ 02:57), Max: 96.4 (06-27 @ 02:57)  HR: 77 (06-27 @ 02:57) (77 - 77)  BP: 100/61 (06-27 @ 02:57) (100/61 - 100/61)  BP(mean): --  RR: 18 (06-27 @ 02:57) (18 - 18)  SpO2: 99% (06-27 @ 02:57) (99% - 99%)    I&O's Summary      REVIEW OF SYSTEMS:  CONSTITUTIONAL: see HPI    No vertigo or throat pain   NECK: No pain or stiffness  RESPIRATORY: No cough, wheezing, hemoptysis; No shortness of breath  CARDIOVASCULAR: No chest pain or palpitations  GASTROINTESTINAL: No abdominal or epigastric pain. No nausea, vomiting, or hematemesis; No  constipation. No melena or hematochezia.  GENITOURINARY: No dysuria, frequency or hematuria  NEUROLOGICAL: No numbness  SKIN: No itching, no rashes    PHYSICAL EXAM:  NEURO: patient is awake , alert and oriented  GEN: Not in acute distress  NECK: no thyroid enlargement, no JVD  LUNGS: Clear to auscultation bilaterally   CARDIOVASCULAR: S1/S2 present, RRR , no murmurs   ABD: Soft, non-tender, non-distended, +BS  EXT: No RADHA      LABS:                        14.1   9.51  )-----------( 260      ( 27 Jun 2019 04:00 )             43.5     06-27    137  |  97<L>  |  20  ----------------------------<  101<H>  4.4   |  23  |  <0.5<L>    Ca    9.8      27 Jun 2019 04:00  Mg     1.5     06-27    TPro  6.4  /  Alb  4.0  /  TBili  1.3<H>  /  DBili  x   /  AST  68<H>  /  ALT  44<H>  /  AlkPhos  54  06-27    PT/INR - ( 27 Jun 2019 04:00 )   PT: 16.40 sec;   INR: 1.43 ratio           Troponin T, Serum: 0.47 ng/mL <HH> (06-27-19 @ 04:00)  Lactate, Blood: 2.8 mmol/L <H> (06-27-19 @ 04:00)    CARDIAC MARKERS ( 27 Jun 2019 04:00 )  x     / 0.47 ng/mL / x     / x     / x          Serum Pro-Brain Natriuretic Peptide: 3464 pg/mL (06-27-19 @ 04:00)      RADIOLOGY:  -CXR: no Acute CP disease    ECG:  sinus rhythm HPI:    28 yrs with PMH of vincent muscular dystrophy , CM s/p cardiac transplant in 2010 at [facility] ( unknwon donor) , depression presented for generalized weakness along with fever and chills for the last 2-3 weeks. pt started to have watery diarrhea , one episode per day associated with nausea, decrease appetite , and generalized weakness. he has been having intermittent fever ( highest was 103) , last time was febrile was 2 days ago .  no Upper resp symptoms , no urinary sx , no abd pain , no joint pain , no rash.  pt is compliant with his meds.  well followed for the transplant , had RV Bx 2 years ago , and cath in january this year ( Normal as per pt) , never had rejection.    PAST MEDICAL & SURGICAL HISTORY  Vincent Muscular Dystrophy  H/O heart transplant: 2010  Status Post Biopsy  Status Post Biopsy: muscle biopsy      SOCIAL HISTORY:  non smoker  no Alcohol  []Drug    ALLERGIES:  No Known Allergies      HOME MEDICATIONS:  Home Medications:  prednisone  mycophenolate  tacrolimus  lexapro      VITALS:   T(F): 96.4 (06-27 @ 02:57), Max: 96.4 (06-27 @ 02:57)  HR: 77 (06-27 @ 02:57) (77 - 77)  BP: 100/61 (06-27 @ 02:57) (100/61 - 100/61)  BP(mean): --  RR: 18 (06-27 @ 02:57) (18 - 18)  SpO2: 99% (06-27 @ 02:57) (99% - 99%)    I&O's Summary      REVIEW OF SYSTEMS:  CONSTITUTIONAL: see HPI  No vertigo or throat pain   NECK: No pain or stiffness  RESPIRATORY: No cough, wheezing, hemoptysis; No shortness of breath  CARDIOVASCULAR: No chest pain or palpitations  GASTROINTESTINAL: No abdominal or epigastric pain. No nausea, vomiting, or hematemesis; No  constipation. No melena or hematochezia.  GENITOURINARY: No dysuria, frequency or hematuria  NEUROLOGICAL: No numbness  SKIN: No itching, no rashes    PHYSICAL EXAM:  NEURO: patient is awake , alert and oriented  GEN: Not in acute distress  NECK: no thyroid enlargement, no JVD  LUNGS: Clear to auscultation bilaterally   CARDIOVASCULAR: S1/S2 present, RRR , no murmurs   ABD: Soft, non-tender, non-distended, +BS  EXT/MS: No RADHA  SKIN: Intact      LABS:                        14.1   9.51  )-----------( 260      ( 27 Jun 2019 04:00 )             43.5     06-27    137  |  97<L>  |  20  ----------------------------<  101<H>  4.4   |  23  |  <0.5<L>    Ca    9.8      27 Jun 2019 04:00  Mg     1.5     06-27    TPro  6.4  /  Alb  4.0  /  TBili  1.3<H>  /  DBili  x   /  AST  68<H>  /  ALT  44<H>  /  AlkPhos  54  06-27    PT/INR - ( 27 Jun 2019 04:00 )   PT: 16.40 sec;   INR: 1.43 ratio           Troponin T, Serum: 0.47 ng/mL <HH> (06-27-19 @ 04:00)  Lactate, Blood: 2.8 mmol/L <H> (06-27-19 @ 04:00)    CARDIAC MARKERS ( 27 Jun 2019 04:00 )  x     / 0.47 ng/mL / x     / x     / x          Serum Pro-Brain Natriuretic Peptide: 3464 pg/mL (06-27-19 @ 04:00)      RADIOLOGY:  -CXR: no Acute CP disease    ECG:  sinus rhythm

## 2019-06-27 NOTE — ED PROVIDER NOTE - PHYSICAL EXAMINATION
CONSTITUTIONAL: WA / WN / NAD  HEAD: NCAT  EYES: PERRL; anicteric.  ENT: Normal pharynx; mucous membranes pink/moist, no erythema.  NECK: Supple; no meningeal signs  CARD: RRR; nl S1/S2; no M/R/G.   RESP: Respiratory rate and effort are normal; breath sounds clear and equal bilaterally.  ABD: Soft, NT ND  MSK/EXT: No gross deformities; full range of motion.  SKIN: Warm and dry;   NEURO: AAOx3  PSYCH: Memory Intact, Normal Affect

## 2019-06-27 NOTE — SBIRT NOTE ADULT - NSSBIRTDRGBRIEFINTDET_GEN_A_CORE
Facilitated patient-centered discussion implementing motivational interviewing to raise patient's awareness of harms around drug use, affirm strengths, elicit change talk, and enhance motivation towards behavioral change.

## 2019-06-27 NOTE — ED PROVIDER NOTE - PROGRESS NOTE DETAILS
page placed for Dr. De La Torre at [facility] [phone] spoke with dr. andrews cardiology fellow, requesting patient be admitted to tele, add on a ck and blood cultures.

## 2019-06-27 NOTE — H&P ADULT - NSHPPHYSICALEXAM_GEN_ALL_CORE
PHYSICAL EXAM:  GENERAL: NAD, lying in bed comfortably  HEAD:  Atraumatic, Normocephalic  EYES: EOMI, PERRLA, conjunctiva and sclera clear  NECK: Supple, No JVD  CHEST/LUNG: Clear to auscultation bilaterally; No rales, rhonchi, wheezing, or rubs. Unlabored respirations  HEART: Regular rate and rhythm; No murmurs, rubs, or gallops  ABDOMEN: Bowel sounds present; Soft, nondistended. Tenderness to deep palpation at epigastrium.   EXTREMITIES: No clubbing, cyanosis, or edema. No calf tenderness, erythema, or edema noted BL.   NERVOUS SYSTEM:  Alert & Oriented X3, speech clear. No deficits   SKIN: No rashes or lesions

## 2019-06-27 NOTE — CONSULT NOTE ADULT - ASSESSMENT
28 yrs with PMH of haque muscular dystrophy , CM s/p cardiac transplant in 2010 at [facility] ( unknown donor) , depression presented for generalized weakness along with fever and chills for the last 2-3 weeks.  cardiology called for evaluation of elevated trop.    - elevated trop : no ACS , could be related to the underlying muscular disorder                          less likely allograft vasculopathy or rejection ( pt has been compliant with medications)  -generalized weakness/fever/diarrhea: r/o infectious etiology ( immunosuppressed ) vs medications side effect    recommendation  admit to telemetry for 24hrs  trend CE , check CK level  IV fluids 100cc/hr for 12 hrs  pan Culture  ID eval  check mycophenolate and tacrolimus level  check 2d echo  pt may need transfer to [facility] for further management per transplant team 28 yrs with PMH of haque muscular dystrophy , CM s/p cardiac transplant in 2010 at [facility] ( unknown donor) , depression presented for generalized weakness along with fever and chills for the last 2-3 weeks.  cardiology called for evaluation of elevated trop.    - elevated trop : no ACS , could be related to the underlying muscular disorder vs demand ischemia                          less likely allograft vasculopathy or rejection ( pt has been compliant with medications)  -generalized weakness/fever/diarrhea: r/o infectious etiology ( immunosuppressed ) vs medications side effect    recommendation  admit to telemetry for 24hrs  trend CE , check CK level  IV fluids 100cc/hr for 12 hrs  pan Culture  ID eval  check mycophenolate and tacrolimus level  check 2d echo  pt may need transfer to [place] for further management per transplant team

## 2019-06-27 NOTE — PROGRESS NOTE ADULT - SUBJECTIVE AND OBJECTIVE BOX
HPI:  This is a 27 yo M w PMH significant for Beckers Muscular Dystrophy s/p heart transplant 2010 ([place]) and depression presenting to the emergency department for a 2 week history of intermittent fever, generalized weakness and malaise. Patient's symptoms all began around the same time; states his last fever was two days ago, and he feels tired without much energy. Since his transplant, he has never experienced these symptoms. He also endorses nausea, decreased appetite, nonbloody diarrhea, dizziness. On arrival, patient states there was a burning in his epigastric/sternal area, without radiation to neck or arm, and also complained of SOB at rest on arrival. Patient denies coughing, wheezing, palpitations, abdominal pain, or vomiting. No travel history or sick contacts; compliant with medications and states the only new change in medication treatment is the addition of escitalopram in March. Patient was here in the ED this Saturday for the same complaints and was d/c to home at that time.      In the ED, vital signs: HR  77, /61, T 96.4, SpO2 99% RA, RR 18. CBC CMP ordered. Trops 0.47. EKG performed:  Normal sinus rhythm, Rightward axis, Incomplete right bundle branch block, Nonspecific ST and T wave abnormality. Cardio consult was placed. (27 Jun 2019 08:27)      T(C): 36.5 (06-27-19 @ 12:28), Max: 36.5 (06-27-19 @ 12:28)  HR: 68 (06-27-19 @ 13:45) (67 - 77)  BP: 99/68 (06-27-19 @ 12:28) (99/54 - 100/61)  RR: 16 (06-27-19 @ 07:29) (16 - 18)  SpO2: 97% (06-27-19 @ 07:29) (97% - 99%)    MOTOR EXAM      Physical Medicine And Rehabilitation Services are not indicated in this patient for the following Reason(s):    [  x  ] Patient is medically unstable POSSIBLE TRANSFER TO [facility] IN [place]      [    ]  Patient does not have appropriate activity orders      [     ] Patient has no weight bearing status for:      [     ] Patient is independently ambulating      [     ]  Patient is from Skilled Nursing Facility and is appropriate to return      [  x   ] Patient was non-ambulatory prior to admission INDEPENDENTLY TRANSFERS AND OPERATES MOTORIZED WC      [     ]  Other      WILL CANCEL PM&R / PT request

## 2019-06-28 ENCOUNTER — TRANSCRIPTION ENCOUNTER (OUTPATIENT)
Age: 29
End: 2019-06-28

## 2019-06-28 LAB
-  COAGULASE NEGATIVE STAPHYLOCOCCUS: SIGNIFICANT CHANGE UP
ALBUMIN SERPL ELPH-MCNC: 3.4 G/DL — LOW (ref 3.5–5.2)
ALP SERPL-CCNC: 49 U/L — SIGNIFICANT CHANGE UP (ref 30–115)
ALT FLD-CCNC: 36 U/L — SIGNIFICANT CHANGE UP (ref 0–41)
ANION GAP SERPL CALC-SCNC: 18 MMOL/L — HIGH (ref 7–14)
AST SERPL-CCNC: 53 U/L — HIGH (ref 0–41)
BASOPHILS # BLD AUTO: 0.01 K/UL — SIGNIFICANT CHANGE UP (ref 0–0.2)
BASOPHILS NFR BLD AUTO: 0.1 % — SIGNIFICANT CHANGE UP (ref 0–1)
BILIRUB SERPL-MCNC: 0.8 MG/DL — SIGNIFICANT CHANGE UP (ref 0.2–1.2)
BUN SERPL-MCNC: 18 MG/DL — SIGNIFICANT CHANGE UP (ref 10–20)
CALCIUM SERPL-MCNC: 9 MG/DL — SIGNIFICANT CHANGE UP (ref 8.5–10.1)
CHLORIDE SERPL-SCNC: 104 MMOL/L — SIGNIFICANT CHANGE UP (ref 98–110)
CK MB CFR SERPL CALC: 31.1 NG/ML — HIGH (ref 0.6–6.3)
CK SERPL-CCNC: 1187 U/L — HIGH (ref 0–225)
CMV DNA CSF QL NAA+PROBE: SIGNIFICANT CHANGE UP
CMV DNA SPEC NAA+PROBE-LOG#: SIGNIFICANT CHANGE UP LOGIU/ML
CO2 SERPL-SCNC: 19 MMOL/L — SIGNIFICANT CHANGE UP (ref 17–32)
CREAT SERPL-MCNC: <0.5 MG/DL — LOW (ref 0.7–1.5)
CULTURE RESULTS: SIGNIFICANT CHANGE UP
EOSINOPHIL # BLD AUTO: 0.21 K/UL — SIGNIFICANT CHANGE UP (ref 0–0.7)
EOSINOPHIL NFR BLD AUTO: 2.4 % — SIGNIFICANT CHANGE UP (ref 0–8)
GLUCOSE SERPL-MCNC: 87 MG/DL — SIGNIFICANT CHANGE UP (ref 70–99)
GRAM STN FLD: SIGNIFICANT CHANGE UP
HCT VFR BLD CALC: 43.3 % — SIGNIFICANT CHANGE UP (ref 42–52)
HGB BLD-MCNC: 13.5 G/DL — LOW (ref 14–18)
IMM GRANULOCYTES NFR BLD AUTO: 0.5 % — HIGH (ref 0.1–0.3)
LYMPHOCYTES # BLD AUTO: 1.61 K/UL — SIGNIFICANT CHANGE UP (ref 1.2–3.4)
LYMPHOCYTES # BLD AUTO: 18.6 % — LOW (ref 20.5–51.1)
MAGNESIUM SERPL-MCNC: 1.9 MG/DL — SIGNIFICANT CHANGE UP (ref 1.8–2.4)
MCHC RBC-ENTMCNC: 28.3 PG — SIGNIFICANT CHANGE UP (ref 27–31)
MCHC RBC-ENTMCNC: 31.2 G/DL — LOW (ref 32–37)
MCV RBC AUTO: 90.8 FL — SIGNIFICANT CHANGE UP (ref 80–94)
METHOD TYPE: SIGNIFICANT CHANGE UP
MONOCYTES # BLD AUTO: 0.84 K/UL — HIGH (ref 0.1–0.6)
MONOCYTES NFR BLD AUTO: 9.7 % — HIGH (ref 1.7–9.3)
NEUTROPHILS # BLD AUTO: 5.95 K/UL — SIGNIFICANT CHANGE UP (ref 1.4–6.5)
NEUTROPHILS NFR BLD AUTO: 68.7 % — SIGNIFICANT CHANGE UP (ref 42.2–75.2)
NRBC # BLD: 0 /100 WBCS — SIGNIFICANT CHANGE UP (ref 0–0)
PLATELET # BLD AUTO: 235 K/UL — SIGNIFICANT CHANGE UP (ref 130–400)
POTASSIUM SERPL-MCNC: 4.2 MMOL/L — SIGNIFICANT CHANGE UP (ref 3.5–5)
POTASSIUM SERPL-SCNC: 4.2 MMOL/L — SIGNIFICANT CHANGE UP (ref 3.5–5)
PROT SERPL-MCNC: 5.6 G/DL — LOW (ref 6–8)
RBC # BLD: 4.77 M/UL — SIGNIFICANT CHANGE UP (ref 4.7–6.1)
RBC # FLD: 13.7 % — SIGNIFICANT CHANGE UP (ref 11.5–14.5)
SODIUM SERPL-SCNC: 141 MMOL/L — SIGNIFICANT CHANGE UP (ref 135–146)
SPECIMEN SOURCE: SIGNIFICANT CHANGE UP
TACROLIMUS SERPL-MCNC: 20.5 NG/ML — SIGNIFICANT CHANGE UP
TROPONIN T SERPL-MCNC: 0.41 NG/ML — CRITICAL HIGH
WBC # BLD: 8.66 K/UL — SIGNIFICANT CHANGE UP (ref 4.8–10.8)
WBC # FLD AUTO: 8.66 K/UL — SIGNIFICANT CHANGE UP (ref 4.8–10.8)

## 2019-06-28 PROCEDURE — 99223 1ST HOSP IP/OBS HIGH 75: CPT

## 2019-06-28 PROCEDURE — 93306 TTE W/DOPPLER COMPLETE: CPT | Mod: 26

## 2019-06-28 RX ORDER — DIPHENHYDRAMINE HCL 50 MG
50 CAPSULE ORAL ONCE
Refills: 0 | Status: COMPLETED | OUTPATIENT
Start: 2019-06-28 | End: 2019-06-28

## 2019-06-28 RX ORDER — ESCITALOPRAM OXALATE 10 MG/1
1 TABLET, FILM COATED ORAL
Qty: 0 | Refills: 0 | DISCHARGE

## 2019-06-28 RX ADMIN — TACROLIMUS 4 MILLIGRAM(S): 5 CAPSULE ORAL at 06:03

## 2019-06-28 RX ADMIN — TACROLIMUS 3 MILLIGRAM(S): 5 CAPSULE ORAL at 22:51

## 2019-06-28 RX ADMIN — Medication 10 MILLIGRAM(S): at 06:01

## 2019-06-28 RX ADMIN — MYCOPHENOLATE MOFETIL 750 MILLIGRAM(S): 250 CAPSULE ORAL at 22:56

## 2019-06-28 RX ADMIN — TACROLIMUS 0.5 MILLIGRAM(S): 5 CAPSULE ORAL at 22:52

## 2019-06-28 RX ADMIN — Medication 81 MILLIGRAM(S): at 11:06

## 2019-06-28 RX ADMIN — Medication 30 MILLILITER(S): at 14:06

## 2019-06-28 RX ADMIN — PANTOPRAZOLE SODIUM 40 MILLIGRAM(S): 20 TABLET, DELAYED RELEASE ORAL at 06:02

## 2019-06-28 RX ADMIN — Medication 30 MILLIGRAM(S): at 06:02

## 2019-06-28 RX ADMIN — Medication 50 MILLIGRAM(S): at 02:10

## 2019-06-28 RX ADMIN — MYCOPHENOLATE MOFETIL 500 MILLIGRAM(S): 250 CAPSULE ORAL at 06:02

## 2019-06-28 NOTE — DISCHARGE NOTE PROVIDER - CARE PROVIDER_API CALL
Elzbieta De La Torre  Phone: [phone]  Fax: (   )    -  Follow Up Time: 1 week    Kenneth Rodriguez (DO)  Internal Medicine  [address] [place] [id number]  Phone: [phone]  Fax: [phone]  Follow Up Time: 1 week

## 2019-06-28 NOTE — CONSULT NOTE ADULT - ASSESSMENT
ASSESSMENT  Generalized malaise and fevers for the past 2 weeks s/p heart transplant 2010 for Vincent's Muscular Dystrophy  PMH of:  OHT 2010 (CMV D+/R-)  Depression  Vincent Muscular Dystrophy    IMPRESSION  1/Fever + Malaise (Drug Reaction vs Infectious Etiology)   -No leukocytosis, afebrile at time of examination, no respiratory distress  -Radiographic imaging shows no evidence of pneumonia  -R/O CMV in patient taking immunosuppressant  -BCX NG    RECOMMENDATIONS  - F/u CMV PCR   - monitor off antimicrobials  - follows at [place]

## 2019-06-28 NOTE — CHART NOTE - NSCHARTNOTEFT_GEN_A_CORE
I was contacted at 6:00 pm by the lab:  Blood culture +ve for aerobic GPC in clusters.  Contacted Dr. Alexia Salgado and informed her of the results.    Recommendation:  Follow PCR in 3 hours:     - if bug is coag -ve > no antibiotics    - if bug MSSA > start cephazolin     - if bug MRSA > start vancomycin    Will sign out with incoming night resident.

## 2019-06-28 NOTE — DISCHARGE NOTE PROVIDER - NSDCCPCAREPLAN_GEN_ALL_CORE_FT
PRINCIPAL DISCHARGE DIAGNOSIS  Diagnosis: Generalized weakness  Assessment and Plan of Treatment: You presented with chest and abdominal discomfort, nausea, diarrhea, weakness, and said you had had fevers at home. It's unclear what caused this, it is possibly viral but we cannot rule out a drug interaction. Please follow up with your PCP and your heart transplant doctor, Dr. De La Torre.      SECONDARY DISCHARGE DIAGNOSES  Diagnosis: Depression  Assessment and Plan of Treatment: You were taking 10 mg escitalopram for your depression when you were admitted but felt that it might be causing your symptoms, so we stopped this medication. Please follow up with your psychiatrist and seek urgent/emergency mental healthcare if your mental health worsens.    Diagnosis: Elevated troponin  Assessment and Plan of Treatment: Some of your labs were abnormal, including your troponin levels, and an ultrasound of your abdomen showed wall thickening of the gallbladder. Please follow up with your heart transplant doctor and your PCP. PRINCIPAL DISCHARGE DIAGNOSIS  Diagnosis: Generalized weakness  Assessment and Plan of Treatment: You presented with chest and abdominal discomfort, nausea, diarrhea, weakness, and said you had had fevers at home. It's unclear what caused this, it might have been viral but we cannot rule out a drug interaction. Please follow up with your PCP and your heart transplant doctor, Dr. De La Torre.      SECONDARY DISCHARGE DIAGNOSES  Diagnosis: Depression  Assessment and Plan of Treatment: You were taking 10 mg escitalopram for your depression when you were admitted but felt that it might be causing your symptoms, so we stopped this medication. Please follow up with your psychiatrist and seek urgent/emergency mental healthcare if your mental health worsens.    Diagnosis: Elevated troponin  Assessment and Plan of Treatment: Some of your labs were abnormal, including your troponin levels, and an ultrasound of your abdomen showed wall thickening of the gallbladder. Please follow up with your heart transplant doctor and your PCP.

## 2019-06-28 NOTE — DISCHARGE NOTE PROVIDER - NSDCFUADDINST_GEN_ALL_CORE_FT
Please follow up with your heart transplant doctor, Dr. De La Torre, and your PCP within one week.   Please also follow up with your psychiatrist within one week and seek urgent or emergency mental healthcare if your mental health worsens.

## 2019-06-28 NOTE — CONSULT NOTE ADULT - SUBJECTIVE AND OBJECTIVE BOX
BROOKLYN CROOK  28y, Male  Allergy: No Known Allergies  CHIEF COMPLAINT: generalized weakness, fever (28 Jun 2019 06:31)  HPI:  This is a 27 yo M w PMH significant for Beckers Muscular Dystrophy s/p heart transplant 2010 ([place]) and depression presenting to the emergency department for a 2 week history of intermittent fever, generalized weakness and malaise. Patient's symptoms all began around the same time; states his last fever was two days ago, and he feels tired without much energy. Since his transplant, he has never experienced these symptoms. He also endorses nausea, decreased appetite, nonbloody diarrhea, dizziness. On arrival, patient states there was a burning in his epigastric/sternal area, without radiation to neck or arm, and also complained of SOB at rest on arrival. Patient denies coughing, wheezing, palpitations, abdominal pain, or vomiting. No travel history or sick contacts; compliant with medications and states the only new change in medication treatment is the addition of escitalopram in March. In the ED, vital signs: HR  77, /61, T 96.4, SpO2 99% RA, RR 18. CBC CMP ordered. Trops 0.47. EKG performed:  Normal sinus rhythm, Rightward axis, Incomplete right bundle branch block, Nonspecific ST and T wave abnormality. Cardio consult was placed. (27 Jun 2019 08:27)    Overnight the patient c/o fever and malaise, but states he is feeling better at time of examination.      FAMILY HISTORY:  No pertinent family history in first degree relatives    PAST MEDICAL & SURGICAL HISTORY:  Depression  Vincent Muscular Dystrophy  H/O heart transplant: 2010  Status Post Biopsy  Status Post Biopsy: muscle biopsy    ROS  General:  fevers, chills,, weight loss  HEENT: Denies headache, rhinorrhea, sore throat, eye pain  CV: c/o chest pain, denies palpitations  PULM: Denies SOB, cough  GI: Denies abdominal pain, diarrhea  : Denies dysuria, hematuria  MSK: Denies arthralgias  SKIN: Denies rash  NEURO: Denies paresthesias, h/o weakness    VITALS:  Vital Signs Last 24 Hrs  T(C): 35.9 (28 Jun 2019 05:38), Max: 36.5 (27 Jun 2019 12:28)  T(F): 96.6 (28 Jun 2019 05:38), Max: 97.7 (27 Jun 2019 12:28)  HR: 80 (28 Jun 2019 05:38) (68 - 80)  BP: 108/60 (28 Jun 2019 05:59) (99/62 - 108/60)  BP(mean): --  RR: 18 (28 Jun 2019 05:38) (18 - 18)  SpO2: 100% (27 Jun 2019 23:15) (99% - 100%)    PHYSICAL EXAM:  Gen: NAD, resting in bed, generalized weakness noted  HEENT: Normocephalic, atraumatic  Neck: supple, no lymphadenopathy  CV: Regular rate & regular rhythm  Lungs: no wheezes or rhonchi  Abdomen: Soft, Non-tender  Ext: Warm, well perfused, no cyanosis or edema  Neuro: non focal, awake, patient has difficulty with movement   Skin: no rash, no erythema    TESTS & MEASUREMENTS:                        13.5   8.66  )-----------( 235      ( 28 Jun 2019 05:16 )             43.3     06-28    141  |  104  |  18  ----------------------------<  87  4.2   |  19  |  <0.5<L>    Ca    9.0      28 Jun 2019 05:16  Mg     1.9     06-28    TPro  5.6<L>  /  Alb  3.4<L>  /  TBili  0.8  /  DBili  x   /  AST  53<H>  /  ALT  36  /  AlkPhos  49  06-28    eGFR if Non African American: 184 mL/min/1.73M2 (06-28-19 @ 05:16)  eGFR if : 214 mL/min/1.73M2 (06-28-19 @ 05:16)    LIVER FUNCTIONS - ( 28 Jun 2019 05:16 )  Alb: 3.4 g/dL / Pro: 5.6 g/dL / ALK PHOS: 49 U/L / ALT: 36 U/L / AST: 53 U/L / GGT: x           Blood Gas Venous - Lactate: 1.9 mmoL/L (06-27-19 @ 07:55)  Lactate, Blood: 2.8 mmol/L (06-27-19 @ 04:00)    RADIOLOGY & ADDITIONAL TESTS:  CXR 6/27/19-No evidence of acute cardiopulmonary disease    CARDIOLOGY TESTING  12 Lead ECG:   Ventricular Rate 78 BPM  Atrial Rate 78 BPM  P-R Interval 140 ms  QRS Duration 96 ms  Q-T Interval 418 ms  QTC Calculation(Bezet) 476 ms  P Axis -15 degrees  R Axis 104 degrees  T Axis -35 degrees  Diagnosis Line Normal sinus rhythm  Rightward axis  Pulmonary disease pattern  Incomplete right bundle branch block  Abnormal QRS-T angle, consider primary T wave abnormality  Abnormal ECG  Confirmed by KISHAN PINA MD [id number]) on 6/28/2019 9:19:08 AM (06-27-19 @ 23:14)  12 Lead ECG:   Ventricular Rate 71 BPM  Atrial Rate 71 BPM  P-R Interval 160 ms  QRS Duration 102 ms  Q-T Interval 470 ms  QTC Calculation(Bezet) 510 ms  P Axis 36 degrees  R Axis 43 degrees  T Axis -41 degrees  Diagnosis Line Normal sinus rhythm  Indeterminate axis  Low voltage QRS  Incomplete right bundle branch block  Nonspecific ST and T wave abnormality  Prolonged QT  Abnormal ECG  Confirmed by Aaron Mcgovern ([id number]) on 6/27/2019 1:12:35 PM (06-27-19 @ 10:42)    MEDICATIONS  aspirin enteric coated 81  ATENolol  Tablet 25  chlorhexidine 4% Liquid 1  enalapril 10  enoxaparin Injectable 40  ergocalciferol 38743  escitalopram 10  mycophenolate mofetil 500  mycophenolate mofetil 750  pantoprazole    Tablet 40  predniSONE   Tablet 30  sodium chloride 0.9%. 1000  tacrolimus 4  tacrolimus 3  tacrolimus 0.5    ANTIBIOTICS: no current antibiotics     ASSESSMENT  Generalized malaise and fevers for the past 2 weeks s/p heart transplant 2010 for Vincent's Muscular Dystrophy  PMH of:  Depression  Vincent Muscular Dystrophy    PROBLEMS  1/Fever + Malaise, Drug Reaction vs Infxn  -No leukocytosis, afebrile at time of examination, no respiratory distress  -Radiographic imaging shows no evidence of pneumonia    RECOMMENDATIONS  - -F/U blood culture and urine cultures  - *** BROOKLYN CROOK  28y, Male  Allergy: No Known Allergies  CHIEF COMPLAINT: generalized weakness, fever (28 Jun 2019 06:31)  HPI:  This is a 29 yo M w PMH significant for Beckers Muscular Dystrophy s/p heart transplant 2010 ([place]) and depression presenting to the emergency department for a 2 week history of intermittent fever, generalized weakness and malaise. Patient's symptoms all began around the same time; states his last fever was two days ago, and he feels tired without much energy. Since his transplant, he has never experienced these symptoms. He also endorses nausea, decreased appetite, nonbloody diarrhea, dizziness. On arrival, patient states there was a burning in his epigastric/sternal area, without radiation to neck or arm, and also complained of SOB at rest on arrival. Patient denies coughing, wheezing, palpitations, abdominal pain, or vomiting. No travel history or sick contacts; compliant with medications and states the only new change in medication treatment is the addition of escitalopram in March. In the ED, vital signs: HR  77, /61, T 96.4, SpO2 99% RA, RR 18. CBC CMP ordered. Trops 0.47. EKG performed:  Normal sinus rhythm, Rightward axis, Incomplete right bundle branch block, Nonspecific ST and T wave abnormality. Cardio consult was placed. (27 Jun 2019 08:27)    Overnight the patient c/o fever and malaise, but states he is feeling better at time of examination. Patient states his previous diarrhea has since resolved.       FAMILY HISTORY:  No pertinent family history in first degree relatives    PAST MEDICAL & SURGICAL HISTORY:  Depression  Vincent Muscular Dystrophy  H/O heart transplant: 2010  Status Post Biopsy  Status Post Biopsy: muscle biopsy    ROS  General:  fevers, chills, denies weight loss  HEENT: Denies headache, rhinorrhea, sore throat, eye pain  CV: c/o chest pain, denies palpitations  PULM: Denies SOB, cough  GI: Denies abdominal pain, denies recent episodes of diarrhea  : Denies dysuria, hematuria  MSK: Denies arthralgias  SKIN: Denies rash  NEURO: Denies paresthesias, h/o weakness    VITALS:  Vital Signs Last 24 Hrs  T(C): 35.9 (28 Jun 2019 05:38), Max: 36.5 (27 Jun 2019 12:28)  T(F): 96.6 (28 Jun 2019 05:38), Max: 97.7 (27 Jun 2019 12:28)  HR: 80 (28 Jun 2019 05:38) (68 - 80)  BP: 108/60 (28 Jun 2019 05:59) (99/62 - 108/60)  BP(mean): --  RR: 18 (28 Jun 2019 05:38) (18 - 18)  SpO2: 100% (27 Jun 2019 23:15) (99% - 100%)    PHYSICAL EXAM:  Gen: NAD, resting in bed, generalized weakness noted during examination  HEENT: Normocephalic, atraumatic  Neck: supple, no lymphadenopathy  CV: Regular rate & regular rhythm  Lungs: no wheezes or rhonchi  Abdomen: Soft, Non-tender  Ext: Warm, well perfused, no cyanosis or edema  Neuro: non focal, awake, patient has difficulty with movement   Skin: no rash, no erythema    TESTS & MEASUREMENTS:                        13.5   8.66  )-----------( 235      ( 28 Jun 2019 05:16 )             43.3     06-28    141  |  104  |  18  ----------------------------<  87  4.2   |  19  |  <0.5<L>    Ca    9.0      28 Jun 2019 05:16  Mg     1.9     06-28    TPro  5.6<L>  /  Alb  3.4<L>  /  TBili  0.8  /  DBili  x   /  AST  53<H>  /  ALT  36  /  AlkPhos  49  06-28    eGFR if Non African American: 184 mL/min/1.73M2 (06-28-19 @ 05:16)  eGFR if : 214 mL/min/1.73M2 (06-28-19 @ 05:16)    LIVER FUNCTIONS - ( 28 Jun 2019 05:16 )  Alb: 3.4 g/dL / Pro: 5.6 g/dL / ALK PHOS: 49 U/L / ALT: 36 U/L / AST: 53 U/L / GGT: x           Blood Gas Venous - Lactate: 1.9 mmoL/L (06-27-19 @ 07:55)  Lactate, Blood: 2.8 mmol/L (06-27-19 @ 04:00)    RADIOLOGY & ADDITIONAL TESTS:  CXR 6/27/19-No evidence of acute cardiopulmonary disease    CARDIOLOGY TESTING  12 Lead ECG:   Ventricular Rate 78 BPM  Atrial Rate 78 BPM  P-R Interval 140 ms  QRS Duration 96 ms  Q-T Interval 418 ms  QTC Calculation(Bezet) 476 ms  P Axis -15 degrees  R Axis 104 degrees  T Axis -35 degrees  Diagnosis Line Normal sinus rhythm  Rightward axis  Pulmonary disease pattern  Incomplete right bundle branch block  Abnormal QRS-T angle, consider primary T wave abnormality  Abnormal ECG  Confirmed by KISHAN PINA MD ([id number]) on 6/28/2019 9:19:08 AM (06-27-19 @ 23:14)  12 Lead ECG:   Ventricular Rate 71 BPM  Atrial Rate 71 BPM  P-R Interval 160 ms  QRS Duration 102 ms  Q-T Interval 470 ms  QTC Calculation(Bezet) 510 ms  P Axis 36 degrees  R Axis 43 degrees  T Axis -41 degrees  Diagnosis Line Normal sinus rhythm  Indeterminate axis  Low voltage QRS  Incomplete right bundle branch block  Nonspecific ST and T wave abnormality  Prolonged QT  Abnormal ECG  Confirmed by Aaron Mcgovern ([id number]) on 6/27/2019 1:12:35 PM (06-27-19 @ 10:42)    MEDICATIONS  aspirin enteric coated 81  ATENolol  Tablet 25  chlorhexidine 4% Liquid 1  enalapril 10  enoxaparin Injectable 40  ergocalciferol 87465  escitalopram 10  mycophenolate mofetil 500  mycophenolate mofetil 750  pantoprazole    Tablet 40  predniSONE   Tablet 30  sodium chloride 0.9%. 1000  tacrolimus 4  tacrolimus 3  tacrolimus 0.5    ANTIBIOTICS: no current antibiotics     ASSESSMENT  Generalized malaise and fevers for the past 2 weeks s/p heart transplant 2010 for Vincent's Muscular Dystrophy  PMH of:  Depression  Vincent Muscular Dystrophy    IMPRESSION  1/Fever + Malaise (Drug Reaction vs Infectious Etiology)   -No leukocytosis, afebrile at time of examination, no respiratory distress  -Radiographic imaging shows no evidence of pneumonia  -R/O CMV in patient taking immunosuppressant    RECOMMENDATIONS  - -F/U blood culture and urine cultures  - CMV PCR to rule out CMV disease associated with immunosuppressive therapy BROOKLYN CROOK  28y, Male  Allergy: No Known Allergies  CHIEF COMPLAINT: generalized weakness, fever (28 Jun 2019 06:31)  HPI:  This is a 29 yo M w PMH significant for Beckers Muscular Dystrophy s/p heart transplant 2010 ([place]) and depression presenting to the emergency department for a 2 week history of intermittent fever, generalized weakness and malaise. Patient's symptoms all began around the same time; states his last fever was two days ago, and he feels tired without much energy. Since his transplant, he has never experienced these symptoms. He also endorses nausea, decreased appetite, nonbloody diarrhea, dizziness. On arrival, patient states there was a burning in his epigastric/sternal area, without radiation to neck or arm, and also complained of SOB at rest on arrival. Patient denies coughing, wheezing, palpitations, abdominal pain, or vomiting. No travel history or sick contacts; compliant with medications and states the only new change in medication treatment is the addition of escitalopram in March. In the ED, vital signs: HR  77, /61, T 96.4, SpO2 99% RA, RR 18. CBC CMP ordered. Trops 0.47. EKG performed:  Normal sinus rhythm, Rightward axis, Incomplete right bundle branch block, Nonspecific ST and T wave abnormality. Cardio consult was placed. (27 Jun 2019 08:27)    Overnight the patient c/o fever and malaise, but states he is feeling better at time of examination. Patient states his previous diarrhea has since resolved.       FAMILY HISTORY:  No pertinent family history in first degree relatives    PAST MEDICAL & SURGICAL HISTORY:  Depression  Vincent Muscular Dystrophy  H/O heart transplant: 2010  Status Post Biopsy  Status Post Biopsy: muscle biopsy    Social History (marital status, living situation, occupation, tobacco use, alcohol and drug use, and sexual history): Lives alone. Uses powerchair. One alcoholic drink per week. Denies any tobacco use or history. Uses marijuana one time per week    ROS  General:  fevers, chills, denies weight loss  HEENT: Denies headache, rhinorrhea, sore throat, eye pain  CV: c/o chest pain, denies palpitations  PULM: Denies SOB, cough  GI: Denies abdominal pain, denies recent episodes of diarrhea  : Denies dysuria, hematuria  MSK: Denies arthralgias  SKIN: Denies rash  NEURO: Denies paresthesias, h/o weakness    VITALS:  Vital Signs Last 24 Hrs  T(C): 35.9 (28 Jun 2019 05:38), Max: 36.5 (27 Jun 2019 12:28)  T(F): 96.6 (28 Jun 2019 05:38), Max: 97.7 (27 Jun 2019 12:28)  HR: 80 (28 Jun 2019 05:38) (68 - 80)  BP: 108/60 (28 Jun 2019 05:59) (99/62 - 108/60)  BP(mean): --  RR: 18 (28 Jun 2019 05:38) (18 - 18)  SpO2: 100% (27 Jun 2019 23:15) (99% - 100%)    PHYSICAL EXAM:  Gen: NAD, resting in bed, generalized weakness noted during examination  HEENT: Normocephalic, atraumatic  Neck: supple, no lymphadenopathy  CV: Regular rate & regular rhythm  Lungs: no wheezes or rhonchi  Abdomen: Soft, Non-tender  Ext: Warm, well perfused, no cyanosis or edema  Neuro: non focal, awake, patient has difficulty with movement   Skin: no rash, no erythema    TESTS & MEASUREMENTS:                        13.5   8.66  )-----------( 235      ( 28 Jun 2019 05:16 )             43.3     06-28    141  |  104  |  18  ----------------------------<  87  4.2   |  19  |  <0.5<L>    Ca    9.0      28 Jun 2019 05:16  Mg     1.9     06-28    TPro  5.6<L>  /  Alb  3.4<L>  /  TBili  0.8  /  DBili  x   /  AST  53<H>  /  ALT  36  /  AlkPhos  49  06-28    eGFR if Non African American: 184 mL/min/1.73M2 (06-28-19 @ 05:16)  eGFR if : 214 mL/min/1.73M2 (06-28-19 @ 05:16)    LIVER FUNCTIONS - ( 28 Jun 2019 05:16 )  Alb: 3.4 g/dL / Pro: 5.6 g/dL / ALK PHOS: 49 U/L / ALT: 36 U/L / AST: 53 U/L / GGT: x           Blood Gas Venous - Lactate: 1.9 mmoL/L (06-27-19 @ 07:55)  Lactate, Blood: 2.8 mmol/L (06-27-19 @ 04:00)    RADIOLOGY & ADDITIONAL TESTS:  CXR 6/27/19-No evidence of acute cardiopulmonary disease    CARDIOLOGY TESTING  12 Lead ECG:   Ventricular Rate 78 BPM  Atrial Rate 78 BPM  P-R Interval 140 ms  QRS Duration 96 ms  Q-T Interval 418 ms  QTC Calculation(Bezet) 476 ms  P Axis -15 degrees  R Axis 104 degrees  T Axis -35 degrees  Diagnosis Line Normal sinus rhythm  Rightward axis  Pulmonary disease pattern  Incomplete right bundle branch block  Abnormal QRS-T angle, consider primary T wave abnormality  Abnormal ECG  Confirmed by KISHAN PINA MD ([id number]) on 6/28/2019 9:19:08 AM (06-27-19 @ 23:14)  12 Lead ECG:   Ventricular Rate 71 BPM  Atrial Rate 71 BPM  P-R Interval 160 ms  QRS Duration 102 ms  Q-T Interval 470 ms  QTC Calculation(Bezet) 510 ms  P Axis 36 degrees  R Axis 43 degrees  T Axis -41 degrees  Diagnosis Line Normal sinus rhythm  Indeterminate axis  Low voltage QRS  Incomplete right bundle branch block  Nonspecific ST and T wave abnormality  Prolonged QT  Abnormal ECG  Confirmed by Aaron Mcgovern ([id number]) on 6/27/2019 1:12:35 PM (06-27-19 @ 10:42)    MEDICATIONS  aspirin enteric coated 81  ATENolol  Tablet 25  chlorhexidine 4% Liquid 1  enalapril 10  enoxaparin Injectable 40  ergocalciferol 75279  escitalopram 10  mycophenolate mofetil 500  mycophenolate mofetil 750  pantoprazole    Tablet 40  predniSONE   Tablet 30  sodium chloride 0.9%. 1000  tacrolimus 4  tacrolimus 3  tacrolimus 0.5    ANTIBIOTICS: no current antibiotics

## 2019-06-28 NOTE — PROGRESS NOTE ADULT - SUBJECTIVE AND OBJECTIVE BOX
BROOKLYN CROOK 28y Male  MRN#: [id number]     SUBJECTIVE  Patient is a 28y old Male who presents with a chief complaint of generalized weakness, fever (27 Jun 2019 14:44)      Today is hospital day 1d, and this morning he is lying in bed without distress.   No acute overnight events.     OBJECTIVE  PAST MEDICAL & SURGICAL HISTORY  Depression  Vincent Muscular Dystrophy  H/O heart transplant: 2010  Status Post Biopsy  Status Post Biopsy: muscle biopsy    ALLERGIES:  No Known Allergies    MEDICATIONS:  STANDING MEDICATIONS  aspirin enteric coated 81 milliGRAM(s) Oral daily  ATENolol  Tablet 25 milliGRAM(s) Oral daily  chlorhexidine 4% Liquid 1 Application(s) Topical <User Schedule>  enalapril 10 milliGRAM(s) Oral two times a day  enoxaparin Injectable 40 milliGRAM(s) SubCutaneous at bedtime  ergocalciferol 77979 Unit(s) Oral every week  escitalopram 10 milliGRAM(s) Oral daily  mycophenolate mofetil 500 milliGRAM(s) Oral <User Schedule>  mycophenolate mofetil 750 milliGRAM(s) Oral at bedtime  pantoprazole    Tablet 40 milliGRAM(s) Oral before breakfast  predniSONE   Tablet 30 milliGRAM(s) Oral daily  sodium chloride 0.9%. 1000 milliLiter(s) IV Continuous <Continuous>  tacrolimus 4 milliGRAM(s) Oral <User Schedule>  tacrolimus 3 milliGRAM(s) Oral at bedtime  tacrolimus 0.5 milliGRAM(s) Oral at bedtime    PRN MEDICATIONS  acetaminophen   Tablet .. 500 milliGRAM(s) Oral every 8 hours PRN    HOME MEDICATIONS  Home Medications:  aspirin 81 mg oral tablet: 1 tab(s) orally once a day (27 Jun 2019 09:31)  atenolol 25 mg oral tablet: 1 tab(s) orally once a day (27 Jun 2019 09:29)  CoQ10 300 mg oral capsule: 1 cap(s) orally once a day (27 Jun 2019 09:19)  enalapril 10 mg oral tablet: 10 milligram(s) orally 2 times a day (27 Jun 2019 09:27)  escitalopram 10 mg oral tablet: 1 tab(s) orally once a day (in the morning) (27 Jun 2019 09:25)  mycophenolate mofetil 250 mg oral capsule: 2 cap(s) orally once a day (in the morning) (27 Jun 2019 09:38)  mycophenolate mofetil 250 mg oral capsule: 3 cap(s) orally once a day (in the evening) (27 Jun 2019 09:38)  predniSONE 10 mg oral tablet: 3 tab(s) orally once a day (in the morning) (27 Jun 2019 09:30)  tacrolimus 0.5 mg oral capsule: 0.5 milligram(s) orally once a day (at bedtime) (27 Jun 2019 09:21)  tacrolimus 1 mg oral capsule: 3 cap(s) orally once a day (in the evening) (27 Jun 2019 09:30)  tacrolimus 1 mg oral capsule: 4 cap(s) orally once a day (in the morning) (27 Jun 2019 09:29)  Tylenol 500 mg oral tablet: 2 tab(s) orally 1 to 2 times a day, As Needed (27 Jun 2019 09:20)  Vitamin D2 50,000 intl units (1.25 mg) oral capsule: 1 cap(s) orally once a week (27 Jun 2019 09:32)      VITAL SIGNS: Last 24 Hours  T(C): 35.9 (28 Jun 2019 05:38), Max: 36.5 (27 Jun 2019 12:28)  T(F): 96.6 (28 Jun 2019 05:38), Max: 97.7 (27 Jun 2019 12:28)  HR: 80 (28 Jun 2019 05:38) (67 - 80)  BP: 108/60 (28 Jun 2019 05:59) (99/54 - 108/60)  BP(mean): --  RR: 18 (28 Jun 2019 05:38) (16 - 18)  SpO2: 100% (27 Jun 2019 23:15) (97% - 100%)    06-27-19 @ 07:01  -  06-28-19 @ 06:31  --------------------------------------------------------  IN: 400 mL / OUT: 0 mL / NET: 400 mL        LABS:                        14.1   9.51  )-----------( 260      ( 27 Jun 2019 04:00 )             43.5     06-27    137  |  97<L>  |  20  ----------------------------<  101<H>  4.4   |  23  |  <0.5<L>    Ca    9.8      27 Jun 2019 04:00  Mg     1.5     06-27    TPro  6.4  /  Alb  4.0  /  TBili  1.3<H>  /  DBili  x   /  AST  68<H>  /  ALT  44<H>  /  AlkPhos  54  06-27    LIVER FUNCTIONS - ( 27 Jun 2019 04:00 )  Alb: 4.0 g/dL / Pro: 6.4 g/dL / ALK PHOS: 54 U/L / ALT: 44 U/L / AST: 68 U/L / GGT: x           PT/INR - ( 27 Jun 2019 04:00 )   PT: 16.40 sec;   INR: 1.43 ratio               Creatine Kinase, Serum: 1826 U/L <H> (06-27-19 @ 11:32)  Troponin T, Serum: 0.39 ng/mL <HH> (06-27-19 @ 11:32)  Creatine Kinase, Serum: 2339 U/L <H> (06-27-19 @ 06:36)      CARDIAC MARKERS ( 27 Jun 2019 11:32 )  x     / 0.39 ng/mL / 1826 U/L / x     / 45.6 ng/mL  CARDIAC MARKERS ( 27 Jun 2019 06:36 )  x     / x     / 2339 U/L / x     / x      CARDIAC MARKERS ( 27 Jun 2019 04:00 )  x     / 0.47 ng/mL / x     / x     / x          CAPILLARY BLOOD GLUCOSE          RADIOLOGY:    PHYSICAL EXAM:  General: NAD, A&O, 28yMale  Head: NC, AT  Eyes: EOMI, conjunctiva and sclera white  Chest/lung: CTAB, no wheezes or crackles, no accessory muscles used  Heart: RRR, no m/r/g  Abdomen: Soft, NTND, no guarding  Ext: No cyanosis, erythema, or edema  Neurology: Non-focal    ADMISSION SUMMARY  Patient is a 28y old Male who presents with a chief complaint of generalized weakness, fever (27 Jun 2019 14:44) BROOKLYN CROOK 28y Male  MRN#: [id number]     SUBJECTIVE  Patient is a 28y old Male who presents with a chief complaint of generalized weakness, fever (27 Jun 2019 14:44)      Today is hospital day 1d, and this morning he is lying in bed without distress.   No acute overnight events. This morning reports he feels well, but last night after received his PM meds he felt nausea with chills, a headache, retrosternal burning, and weak and SOB; denies vomiting or diarrhea, reports that the symptoms resolved and he feels well now. Reports that at home when this happens at night he also has diarrhea. Denies travel or sick contacts. He was afebrile in hospital.   Patient reports that he feels certain that his Lexapro is causing this, reports he started Lexapro in March/April and the symptoms started 3-4 weeks ago and he had a fever of 103 F a month ago that resolved quickly on its own.     OBJECTIVE  PAST MEDICAL & SURGICAL HISTORY  Depression  Vincent Muscular Dystrophy  H/O heart transplant: 2010  Status Post Biopsy  Status Post Biopsy: muscle biopsy    ALLERGIES:  No Known Allergies    MEDICATIONS:  STANDING MEDICATIONS  aspirin enteric coated 81 milliGRAM(s) Oral daily  ATENolol  Tablet 25 milliGRAM(s) Oral daily  chlorhexidine 4% Liquid 1 Application(s) Topical <User Schedule>  enalapril 10 milliGRAM(s) Oral two times a day  enoxaparin Injectable 40 milliGRAM(s) SubCutaneous at bedtime  ergocalciferol 24714 Unit(s) Oral every week  escitalopram 10 milliGRAM(s) Oral daily  mycophenolate mofetil 500 milliGRAM(s) Oral <User Schedule>  mycophenolate mofetil 750 milliGRAM(s) Oral at bedtime  pantoprazole    Tablet 40 milliGRAM(s) Oral before breakfast  predniSONE   Tablet 30 milliGRAM(s) Oral daily  sodium chloride 0.9%. 1000 milliLiter(s) IV Continuous <Continuous>  tacrolimus 4 milliGRAM(s) Oral <User Schedule>  tacrolimus 3 milliGRAM(s) Oral at bedtime  tacrolimus 0.5 milliGRAM(s) Oral at bedtime    PRN MEDICATIONS  acetaminophen   Tablet .. 500 milliGRAM(s) Oral every 8 hours PRN    HOME MEDICATIONS  Home Medications:  aspirin 81 mg oral tablet: 1 tab(s) orally once a day (27 Jun 2019 09:31)  atenolol 25 mg oral tablet: 1 tab(s) orally once a day (27 Jun 2019 09:29)  CoQ10 300 mg oral capsule: 1 cap(s) orally once a day (27 Jun 2019 09:19)  enalapril 10 mg oral tablet: 10 milligram(s) orally 2 times a day (27 Jun 2019 09:27)  escitalopram 10 mg oral tablet: 1 tab(s) orally once a day (in the morning) (27 Jun 2019 09:25)  mycophenolate mofetil 250 mg oral capsule: 2 cap(s) orally once a day (in the morning) (27 Jun 2019 09:38)  mycophenolate mofetil 250 mg oral capsule: 3 cap(s) orally once a day (in the evening) (27 Jun 2019 09:38)  predniSONE 10 mg oral tablet: 3 tab(s) orally once a day (in the morning) (27 Jun 2019 09:30)  tacrolimus 0.5 mg oral capsule: 0.5 milligram(s) orally once a day (at bedtime) (27 Jun 2019 09:21)  tacrolimus 1 mg oral capsule: 3 cap(s) orally once a day (in the evening) (27 Jun 2019 09:30)  tacrolimus 1 mg oral capsule: 4 cap(s) orally once a day (in the morning) (27 Jun 2019 09:29)  Tylenol 500 mg oral tablet: 2 tab(s) orally 1 to 2 times a day, As Needed (27 Jun 2019 09:20)  Vitamin D2 50,000 intl units (1.25 mg) oral capsule: 1 cap(s) orally once a week (27 Jun 2019 09:32)      VITAL SIGNS: Last 24 Hours  T(C): 35.9 (28 Jun 2019 05:38), Max: 36.5 (27 Jun 2019 12:28)  T(F): 96.6 (28 Jun 2019 05:38), Max: 97.7 (27 Jun 2019 12:28)  HR: 80 (28 Jun 2019 05:38) (67 - 80)  BP: 108/60 (28 Jun 2019 05:59) (99/54 - 108/60)  BP(mean): --  RR: 18 (28 Jun 2019 05:38) (16 - 18)  SpO2: 100% (27 Jun 2019 23:15) (97% - 100%)    06-27-19 @ 07:01  -  06-28-19 @ 06:31  --------------------------------------------------------  IN: 400 mL / OUT: 0 mL / NET: 400 mL        LABS:                        14.1   9.51  )-----------( 260      ( 27 Jun 2019 04:00 )             43.5     06-27    137  |  97<L>  |  20  ----------------------------<  101<H>  4.4   |  23  |  <0.5<L>    Ca    9.8      27 Jun 2019 04:00  Mg     1.5     06-27    TPro  6.4  /  Alb  4.0  /  TBili  1.3<H>  /  DBili  x   /  AST  68<H>  /  ALT  44<H>  /  AlkPhos  54  06-27    LIVER FUNCTIONS - ( 27 Jun 2019 04:00 )  Alb: 4.0 g/dL / Pro: 6.4 g/dL / ALK PHOS: 54 U/L / ALT: 44 U/L / AST: 68 U/L / GGT: x           PT/INR - ( 27 Jun 2019 04:00 )   PT: 16.40 sec;   INR: 1.43 ratio               Creatine Kinase, Serum: 1826 U/L <H> (06-27-19 @ 11:32)  Troponin T, Serum: 0.39 ng/mL <HH> (06-27-19 @ 11:32)  Creatine Kinase, Serum: 2339 U/L <H> (06-27-19 @ 06:36)      CARDIAC MARKERS ( 27 Jun 2019 11:32 )  x     / 0.39 ng/mL / 1826 U/L / x     / 45.6 ng/mL  CARDIAC MARKERS ( 27 Jun 2019 06:36 )  x     / x     / 2339 U/L / x     / x      CARDIAC MARKERS ( 27 Jun 2019 04:00 )  x     / 0.47 ng/mL / x     / x     / x          PHYSICAL EXAM:  General: NAD, A&O, 28yMale  Head: NC, AT  Eyes: EOMI, conjunctiva and sclera white  Chest/lung: CTAB, no wheezes or crackles, no accessory muscles used  Heart: RRR, no m/r/g  Abdomen: Soft, NTND, no guarding  Ext: No cyanosis, erythema, or edema. Tattoos on left arm.  Neurology: BLE 0/5 strength BUE full movement and 4/5 strength    ADMISSION SUMMARY  Patient is a 28y old Male who presents with a chief complaint of generalized weakness, fever (27 Jun 2019 14:44)

## 2019-06-28 NOTE — DISCHARGE NOTE PROVIDER - HOSPITAL COURSE
29 yo M w PMH significant for Beckers Muscular Dystrophy s/p heart transplant 2010 ([place]) and depression presenting to the emergency department for a 2-4 week history of intermittent fever, generalized weakness and malaise. Admitted to tele for observation and evaluation.        #Fever at home, weakness, diarrhea, malaise, without white count or fever in hospital. Infectious vs. medication side effect, will consult ID as pt immunosuppressed.     -Spoke with cardiology, unlikely to be related to heart transplant, but will call his heart transplant Dr. De La Torre, [phone] to discuss    - Zofran prn    - Afebrile since yesterday; continue to monitor    - No leukocytosis present - continue to monitor    - Blood cx ordered, urine culture ordered    - Compliant with medications     - Advised patient that medical team not yet sure of the cause of his symptoms, do not recommend that patient leave the hospital without further evaluation, explained will be discussing with Dr. De La Torre as per patient's request        #Elevated troponins, LFT's, stable. No ACS, no events on tele. Likely due to muscular dystrophy. 6/27 RUQ US with gallbladder sludge, with extensive wall thickening of the gallbladder to     10 mm, negative sonographic Parsons's sign,small perihepatic ascites.    - FU tacrolimus and mycophenolate levels drawn    - 2d echo noted, reviewed with Dr. De La Torre on phone, WNL from report    - Follow up with transplant physician Dr. De La Torre at [place] [facility]         #Depression    - Patient feels sure that his symptoms are due to escitalopram and is on a low dose of 10 mg; will hold it and see how symptoms respond    - outpt Psych f/u 27 yo M w PMH significant for Beckers Muscular Dystrophy s/p heart transplant 2010 ([place]) and depression presenting to the emergency department for a 2-4 week history of intermittent fever, generalized weakness and malaise. Admitted to tele for observation and evaluation. ID consulted, spoke with his heart transplant Dr. De La Torre, and obtained a RUQ ultrasound that showed gallbladder sludge with extensive wall thickening of the gallbladder to 10 mm, negative sonographic Parsons's sign, small perihepatic ascites. Also obtained TTE and reviewed with Dr. De La Torre, wnl from report. Cooper tacrolimus and mycopyhenolate lvevels, tacrolimus 20.5. Sent a CMV PCR as well, per ID. Lexapro was held, given that the patient felt sure that his symptoms were due to escitalopram and he was on a low dose of 10 mg. 29 yo M w PMH significant for Beckers Muscular Dystrophy s/p heart transplant 2010 ([place]) and depression presented to the emergency department for a 2-4 week history of intermittent fever, generalized weakness and malaise. Admitted to tele for observation and evaluation with no events on tele. ID consulted, spoke with his heart transplant Dr. De La Torre, and obtained a RUQ ultrasound that showed gallbladder sludge with extensive wall thickening of the gallbladder to 10 mm, negative sonographic Parsons's sign, small perihepatic ascites. Also obtained TTE and reviewed with Dr. De La Torre, wnl from report. Cooper tacrolimus and mycopyhenolate lvevels, tacrolimus 20.5, mycophenolate still pending. CMV PCR checked per ID, was negative. Lexapro was held, given that the patient felt sure that his symptoms were due to escitalopram and he was on a low dose of 10 mg, and he reports no symptoms when it was held; patient was advised to stop taking it and follow up promptly with his mental healthcare providers. 27 yo M w PMH significant for Beckers Muscular Dystrophy s/p heart transplant 2010 ([place]) and depression presented to the emergency department for a 2-4 week history of intermittent fever, generalized weakness and malaise. Admitted to tele for observation and evaluation with no events on tele. ID consulted, spoke with his heart transplant Dr. De La Torre, and obtained a RUQ ultrasound that showed gallbladder sludge with extensive wall thickening of the gallbladder to 10 mm, negative sonographic Parsons's sign, small perihepatic ascites. Also obtained TTE and reviewed with Dr. De La Torre, wnl from report. Cooper tacrolimus and mycopyhenolate lvevels, tacrolimus 20.5, mycophenolate still pending. CMV PCR checked per ID, was negative. Lexapro was held, given that the patient felt sure that his symptoms were due to escitalopram and he was on a low dose of 10 mg, and he reports no symptoms when it was held; patient was advised to stop taking it and follow up promptly with his mental healthcare providers. The patient is stable and ready for discharge with close follow-up, as instructed.

## 2019-06-28 NOTE — PROGRESS NOTE ADULT - ASSESSMENT
This is a 29 yo M w PMH significant for Beckers Muscular Dystrophy s/p heart transplant 2010 ([place]) and depression presenting to the emergency department for a 2 week history of intermittent fever, generalized weakness and malaise.    #) Fever, weakness, diarrhea  - infectious vs. medication side effect  - Zofran prn  - Afebrile since yesterday; continue to monitor  - No leukocytosis present - continue to monitor  - Blood cx ordered, urine culture ordered  - Compliant with medications   - ID consult pending    #) Elevated troponins  - No ACS  - Less likely rejection as pt has been compliant  - Admit to tele for 24 hr  - 0.47 troponin - continue to trend   - CKMB ordered  - FU tacrolimus and mycophenolate levels  - 2d echo ordered  - IV fluids 100cc/hr for 12 hr per cardio  - Follow up with transplant physician at [place]     #)Mild transaminitis  - RUQ US ordered  - Continue to follow     #) Depression  - Continue escitalopram    #) DVT ppx  -Lovenox ordered    #) GI ppx  -Protonix ordered qd for chronic steroid use    #) Dispo  - Arrived from home; d/c to home when medically stable    #) Code status  - Full code This is a 29 yo M w PMH significant for Beckers Muscular Dystrophy s/p heart transplant 2010 ([place]) and depression presenting to the emergency department for a 2-4 week history of intermittent fever, generalized weakness and malaise. Admitted to tele for observation and evaluation.    #Fever at home, weakness, diarrhea, malaise, without white count or fever in hospital. Infectious vs. medication side effect, will consult ID as pt immunosuppressed.   -Spoke with cardiology, unlikely to be related to heart transplant, but will call his heart transplant Dr. De La Torre, [phone] to discuss  - Zofran prn  - Afebrile since yesterday; continue to monitor  - No leukocytosis present - continue to monitor  - Blood cx ordered, urine culture ordered  - Compliant with medications   - Advised patient that medical team not yet sure of the cause of his symptoms, do not recommend that patient leave the hospital without further evaluation, explained will be discussing with Dr. De La Torre as per patient's request    #Elevated troponins, LFT's, stable. No ACS, no events on tele. Likely due to muscular dystrophy. 6/27 RUQ US with gallbladder sludge, with extensive wall thickening of the gallbladder to   10 mm, negative sonographic Parsons's sign,small perihepatic ascites.  - FU tacrolimus and mycophenolate levels  - 2d echo ordered  - IV fluids 100cc/hr for 12 hr per cardio  - Follow up with transplant physician Dr. De La Torre at [place]  - F/u PCP    #Depression  - Patient feels sure that his symptoms are due to escitalopram and is on a low dose; will hold it and see how symptoms respond  - Psych f/u     #DVT ppx: Lovenox  #:GI ppx -Protonix and Maalox  #Dispo- Likely back to home when medically stable  #CODE: FULL This is a 29 yo M w PMH significant for Beckers Muscular Dystrophy s/p heart transplant 2010 ([place]) and depression presenting to the emergency department for a 2-4 week history of intermittent fever, generalized weakness and malaise. Admitted to tele for observation and evaluation.    #Fever at home, weakness, diarrhea, malaise, without white count or fever in hospital. Infectious vs. medication side effect, will consult ID as pt immunosuppressed.   -Spoke with cardiology, unlikely to be related to heart transplant, but will call his heart transplant Dr. De La Torre, [phone] to discuss  - Zofran prn  - Afebrile since yesterday; continue to monitor  - No leukocytosis present - continue to monitor  - Blood cx ordered, urine culture ordered  - Compliant with medications   - Advised patient that medical team not yet sure of the cause of his symptoms, do not recommend that patient leave the hospital without further evaluation, explained will be discussing with Dr. De La Torre as per patient's request    #Elevated troponins, LFT's, stable. No ACS, no events on tele. Likely due to muscular dystrophy. 6/27 RUQ US with gallbladder sludge, with extensive wall thickening of the gallbladder to   10 mm, negative sonographic Parsons's sign,small perihepatic ascites.  - FU tacrolimus and mycophenolate levels  - 2d echo ordered  - IV fluids 100cc/hr for 12 hr per cardio  - Follow up with transplant physician Dr. De La Torre at [place]  - F/u PCP    #Depression  - Patient feels sure that his symptoms are due to escitalopram and is on a low dose of 10 mg; will hold it and see how symptoms respond  - Psych f/u     #DVT ppx: Lovenox  #:GI ppx -Protonix and Maalox  #Dispo- Likely back to home when medically stable  #CODE: FULL

## 2019-06-28 NOTE — CHART NOTE - NSCHARTNOTEFT_GEN_A_CORE
Spoke with Dr. De La Torre from [facility] [phone] and readback echo results. Echo seems to be within normal limits.   She asked that tacrolimus lv be checked and CMV PCR. Otherwise, if not cardiac can discharge with follow up with her clinic.

## 2019-06-28 NOTE — DISCHARGE NOTE PROVIDER - PROVIDER TOKENS
FREE:[LAST:[Britt],FIRST:[Elzbieta],PHONE:[phone]],FAX:[(   [phone]    -],FOLLOWUP:[1 week]],PROVIDER:[[id number]],FOLLOWUP:[1 week]]

## 2019-06-29 ENCOUNTER — TRANSCRIPTION ENCOUNTER (OUTPATIENT)
Age: 29
End: 2019-06-29

## 2019-06-29 VITALS
SYSTOLIC BLOOD PRESSURE: 95 MMHG | RESPIRATION RATE: 18 BRPM | TEMPERATURE: 97 F | HEART RATE: 75 BPM | DIASTOLIC BLOOD PRESSURE: 61 MMHG

## 2019-06-29 LAB
ALBUMIN SERPL ELPH-MCNC: 3.6 G/DL — SIGNIFICANT CHANGE UP (ref 3.5–5.2)
ALP SERPL-CCNC: 49 U/L — SIGNIFICANT CHANGE UP (ref 30–115)
ALT FLD-CCNC: 36 U/L — SIGNIFICANT CHANGE UP (ref 0–41)
ANION GAP SERPL CALC-SCNC: 16 MMOL/L — HIGH (ref 7–14)
AST SERPL-CCNC: 38 U/L — SIGNIFICANT CHANGE UP (ref 0–41)
BASOPHILS # BLD AUTO: 0.01 K/UL — SIGNIFICANT CHANGE UP (ref 0–0.2)
BASOPHILS NFR BLD AUTO: 0.1 % — SIGNIFICANT CHANGE UP (ref 0–1)
BILIRUB SERPL-MCNC: 0.8 MG/DL — SIGNIFICANT CHANGE UP (ref 0.2–1.2)
BUN SERPL-MCNC: 18 MG/DL — SIGNIFICANT CHANGE UP (ref 10–20)
CALCIUM SERPL-MCNC: 9.4 MG/DL — SIGNIFICANT CHANGE UP (ref 8.5–10.1)
CHLORIDE SERPL-SCNC: 103 MMOL/L — SIGNIFICANT CHANGE UP (ref 98–110)
CK MB CFR SERPL CALC: 28.8 NG/ML — HIGH (ref 0.6–6.3)
CK SERPL-CCNC: 684 U/L — HIGH (ref 0–225)
CO2 SERPL-SCNC: 18 MMOL/L — SIGNIFICANT CHANGE UP (ref 17–32)
CREAT SERPL-MCNC: <0.5 MG/DL — LOW (ref 0.7–1.5)
CULTURE RESULTS: SIGNIFICANT CHANGE UP
EOSINOPHIL # BLD AUTO: 0.01 K/UL — SIGNIFICANT CHANGE UP (ref 0–0.7)
EOSINOPHIL NFR BLD AUTO: 0.1 % — SIGNIFICANT CHANGE UP (ref 0–8)
GLUCOSE SERPL-MCNC: 94 MG/DL — SIGNIFICANT CHANGE UP (ref 70–99)
HCT VFR BLD CALC: 42.7 % — SIGNIFICANT CHANGE UP (ref 42–52)
HGB BLD-MCNC: 13.7 G/DL — LOW (ref 14–18)
IMM GRANULOCYTES NFR BLD AUTO: 0.6 % — HIGH (ref 0.1–0.3)
LYMPHOCYTES # BLD AUTO: 1.02 K/UL — LOW (ref 1.2–3.4)
LYMPHOCYTES # BLD AUTO: 12.1 % — LOW (ref 20.5–51.1)
MAGNESIUM SERPL-MCNC: 2.1 MG/DL — SIGNIFICANT CHANGE UP (ref 1.8–2.4)
MCHC RBC-ENTMCNC: 28.4 PG — SIGNIFICANT CHANGE UP (ref 27–31)
MCHC RBC-ENTMCNC: 32.1 G/DL — SIGNIFICANT CHANGE UP (ref 32–37)
MCV RBC AUTO: 88.4 FL — SIGNIFICANT CHANGE UP (ref 80–94)
MONOCYTES # BLD AUTO: 1.2 K/UL — HIGH (ref 0.1–0.6)
MONOCYTES NFR BLD AUTO: 14.2 % — HIGH (ref 1.7–9.3)
MYCOPHENOLATE SERPL-MCNC: SIGNIFICANT CHANGE UP
NEUTROPHILS # BLD AUTO: 6.15 K/UL — SIGNIFICANT CHANGE UP (ref 1.4–6.5)
NEUTROPHILS NFR BLD AUTO: 72.9 % — SIGNIFICANT CHANGE UP (ref 42.2–75.2)
NRBC # BLD: 0 /100 WBCS — SIGNIFICANT CHANGE UP (ref 0–0)
ORGANISM # SPEC MICROSCOPIC CNT: SIGNIFICANT CHANGE UP
ORGANISM # SPEC MICROSCOPIC CNT: SIGNIFICANT CHANGE UP
PLATELET # BLD AUTO: 259 K/UL — SIGNIFICANT CHANGE UP (ref 130–400)
POTASSIUM SERPL-MCNC: 4.7 MMOL/L — SIGNIFICANT CHANGE UP (ref 3.5–5)
POTASSIUM SERPL-SCNC: 4.7 MMOL/L — SIGNIFICANT CHANGE UP (ref 3.5–5)
PROT SERPL-MCNC: 5.8 G/DL — LOW (ref 6–8)
RBC # BLD: 4.83 M/UL — SIGNIFICANT CHANGE UP (ref 4.7–6.1)
RBC # FLD: 13.7 % — SIGNIFICANT CHANGE UP (ref 11.5–14.5)
SODIUM SERPL-SCNC: 137 MMOL/L — SIGNIFICANT CHANGE UP (ref 135–146)
SPECIMEN SOURCE: SIGNIFICANT CHANGE UP
TROPONIN T SERPL-MCNC: 0.48 NG/ML — CRITICAL HIGH
WBC # BLD: 8.44 K/UL — SIGNIFICANT CHANGE UP (ref 4.8–10.8)
WBC # FLD AUTO: 8.44 K/UL — SIGNIFICANT CHANGE UP (ref 4.8–10.8)

## 2019-06-29 PROCEDURE — 99239 HOSP IP/OBS DSCHRG MGMT >30: CPT

## 2019-06-29 RX ADMIN — Medication 81 MILLIGRAM(S): at 11:11

## 2019-06-29 RX ADMIN — TACROLIMUS 4 MILLIGRAM(S): 5 CAPSULE ORAL at 06:10

## 2019-06-29 RX ADMIN — MYCOPHENOLATE MOFETIL 500 MILLIGRAM(S): 250 CAPSULE ORAL at 06:11

## 2019-06-29 RX ADMIN — Medication 10 MILLIGRAM(S): at 06:10

## 2019-06-29 RX ADMIN — ATENOLOL 25 MILLIGRAM(S): 25 TABLET ORAL at 06:10

## 2019-06-29 RX ADMIN — Medication 30 MILLIGRAM(S): at 06:10

## 2019-06-29 RX ADMIN — CHLORHEXIDINE GLUCONATE 1 APPLICATION(S): 213 SOLUTION TOPICAL at 06:10

## 2019-06-29 RX ADMIN — PANTOPRAZOLE SODIUM 40 MILLIGRAM(S): 20 TABLET, DELAYED RELEASE ORAL at 06:10

## 2019-06-29 NOTE — PROGRESS NOTE ADULT - SUBJECTIVE AND OBJECTIVE BOX
CROOKBROOKLYN GABRIEL  28y, Male  Allergy: No Known Allergies      CHIEF COMPLAINT: generalized weakness, fever (2019 15:42)      INTERVAL EVENTS/HPI  - No acute events overnight  - BCX coNS, contaminant   - T(F): , Max: 96.9 (19 @ 12:56)  - Denies any worsening symptoms  - Tolerating medication  - WBC Count: 8.44 K/uL (19 @ 05:46)      ROS  Negative except as per noted above in HPI  Denies cough  Denies diarrhea  Denies dysuria   Denies pain at any IV site     FH noncontributory    VITALS:  T(F): 96.6, Max: 96.9 (19 @ 12:56)  HR: 75  BP: 95/61  RR: 18Vital Signs Last 24 Hrs  T(C): 35.9 (2019 05:55), Max: 36.1 (2019 12:56)  T(F): 96.6 (2019 05:55), Max: 96.9 (2019 12:56)  HR: 75 (2019 05:55) (75 - 80)  BP: 95/61 (2019 05:55) (83/53 - 102/69)  BP(mean): --  RR: 18 (2019 05:55) (18 - 18)  SpO2: 98% (2019 12:43) (98% - 98%)    PHYSICAL EXAM:  Gen: NAD, resting in bed  HEENT: Normocephalic, atraumatic  Neck: supple, no lymphadenopathy  CV: Regular rate & regular rhythm  Lungs: decreased BS at bases, no fremitus  Abdomen: Soft, BS present  Ext: Warm, well perfused  Neuro: non focal, awake  Skin: no rash, no erythema  Lines: no phlebitis      TESTS & MEASUREMENTS:                        13.7   8.44  )-----------( 259      ( 2019 05:46 )             42.7         137  |  103  |  18  ----------------------------<  94  4.7   |  18  |  <0.5<L>    Ca    9.4      2019 05:46  Mg     2.1         TPro  5.8<L>  /  Alb  3.6  /  TBili  0.8  /  DBili  x   /  AST  38  /  ALT  36  /  AlkPhos  49      eGFR if Non African American: 184 mL/min/1.73M2 (19 @ 05:46)  eGFR if : 214 mL/min/1.73M2 (19 @ 05:46)    LIVER FUNCTIONS - ( 2019 05:46 )  Alb: 3.6 g/dL / Pro: 5.8 g/dL / ALK PHOS: 49 U/L / ALT: 36 U/L / AST: 38 U/L / GGT: x               Culture - Urine (collected 19 @ 22:20)  Source: .Urine Clean Catch (Midstream)  Final Report (19 @ 20:53):    <10,000 CFU/mL Normal Urogenital Brenda    Culture - Blood (collected 19 @ 06:36)  Source: .Blood Blood-Peripheral  Gram Stain (19 @ 17:56):    Growth in aerobic bottle: Gram Positive Cocci in Clusters  Preliminary Report (19 @ 17:56):    Growth in aerobic bottle: Gram Positive Cocci in Clusters    "Due to technical problems, Proteus sp. will Not be reported as part of    the BCID panel until further notice"    ***Blood Panel PCR results on this specimen are available    approximately 3 hours after the Gram stain result.***    Gram stain, PCR, and/or culture results may not always    correspond due to difference in methodologies.    ************************************************************    This PCR assay was performed using [employer].    The following targets are tested for: Enterococcus,    vancomycin resistant enterococci, Listeria monocytogenes,    coagulase negative staphylococci, S. aureus,    methicillin resistant S. aureus, Streptococcus agalactiae    (Group B), S. pneumoniae, S.pyogenes (Group A),    Acinetobacter baumannii, Enterobacter cloacae, E. coli,    Klebsiella oxytoca, K. pneumoniae, Proteus sp.,    Serratia marcescens, Haemophilus influenzae,    Neisseria meningitidis, Pseudomonas aeruginosa, Candida    albicans, C. glabrata, C krusei, C parapsilosis,    C. tropicalis and the KPC resistance gene.  Organism: Blood Culture PCR (19 @ 19:16)  Organism: Blood Culture PCR (19 @ 19:16)      -  Coagulase negative Staphylococcus: Detec      Method Type: PCR    Culture - Blood (collected 19 @ 06:36)  Source: .Blood Blood-Peripheral  Preliminary Report (19 @ 14:01):    No growth to date.        Blood Gas Venous - Lactate: 1.9 mmoL/L (19 @ 07:55)  Lactate, Blood: 2.8 mmol/L (19 @ 04:00)      INFECTIOUS DISEASES TESTING      RADIOLOGY & ADDITIONAL TESTS:  I have personally reviewed the last Chest xray  CXR      CT      CARDIOLOGY TESTING  Transthoracic Echocardiogram:    EXAM:  2-D ECHO (TTE) COMPLETE        PROCEDURE DATE:  2019      INTERPRETATION:  REPORT:    TRANSTHORACIC ECHOCARDIOGRAM REPORT         Patient Name:   BROOKLYN CROOK Accession #: [id number]  Medical Rec #:  [id number]      Height:      65.0 in 165.1 cm  YOB: 1990      Weight:      150.0 lb 68.04 kg  Patient Age:    28 years       BSA:         1.75 m²  Patient Gender: M              BP:          108/60 mmHg       Date of Exam:        2019 1:30:57 PM  Referring Physician: [id number] BRADEN JEFFERS  Sonographer:         Kay Mcmahan  Reading Physician:   Dean Calzada MD.    Procedure:   2D Echo/Doppler/Color Doppler Complete.  Indications: I42.9 - Cardiomyopathy, unspecified  Diagnosis:   I42.9 - Cardiomyopathy, unspecified         Summary:   1. LV Ejection Fraction by Muller's Method with a biplane EF of 64 %.   2. Normal left ventricular size and wall thicknesses, with normal   systolic and diastolic function.   3. The mean global longitudinal strain by speckle tracking is -9.6%   which is reduced.   4. Mild aortic regurgitation.   5. LA volume Index is 18.8 ml/m² ml/m2.    PHYSICIAN INTERPRETATION:  Left Ventricle: Normal left ventricular size and wall thicknesses, with   normal systolic and diastolicfunction. The mean global longitudinal   strain by speckle tracking is -9.6% which is reduced.  Right Ventricle: Normal right ventricular size and function.  Left Atrium: Normal left atrial size. LA volume Index is 18.8 ml/m² ml/m2.  Right Atrium: Normal right atrial size.  Pericardium: A small pericardial effusion is present. The pericardial   effusion is surrounding the apex. There is no evidence of cardiac   tamponade.  Mitral Valve: Structurally normal mitral valve, with normal leaflet   excursion. The mitral valve is normal in structure. Moderate mitral valve   regurgitation is seen.  Tricuspid Valve: Structurally normal tricuspid valve, with normal leaflet   excursion. The tricuspid valve is normal in structure. No tricuspid   regurgitation is visualized.  Aortic Valve: Normal trileaflet aortic valve with normal opening. The   aortic valve is normal. Mild aortic valve regurgitation is seen.  Pulmonic Valve: Structurally normal pulmonic valve, with normal leaflet   excursion. The pulmonic valve is normal. No indication of pulmonic valve   regurgitation.  Aorta: The aortic root and ascending aorta are structurally normal, with   no evidence of dilitation.  Pulmonary Artery: The main pulmonary artery is normal in size.  Venous: Theinferior vena cava was dilated, with respiratory size   variation less than 50%.       2D AND M-MODE MEASUREMENTS (normal ranges within parentheses):  Left                  Normal   Aorta/Left             Normal  Ventricle:                     Atrium:  IVSd (2D):  1.05 cm  (0.7-1.1) AoV Cusp       1.92  (1.5-2.6)  LVPWd       1.11 cm  (0.7-1.1) Separation:     cm  (2D):                          Left Atrium    4.46  (1.9-4.0)  LVIDd       3.65 cm  (3.4-5.7) (Mmode):        cm  (2D):          LA Volume      18.8  LVIDs       2.72 cm            Index         ml/m²  (2D):                          Right  LV FS       25.4 %    (>25%)   Ventricle:  (2D):                          RVd (2D):      2.62 cm  IVSd        0.85 cm  (0.7-1.1) TAPSE:         0.70 cm  (Mmode):  LVPWd       0.94 cm  (0.7-1.1)  (Mmode):  LVIDd       4.00 cm  (3.4-5.7)  (Mmode):  LVIDs       2.38 cm  (Mmode):  LV FS       40.7 %    (>25%)  (Mmode):  Relative     0.61     (<0.42)  Wall  Thickness  Rel. Wall    0.47     (<0.42)  Thickness  Mm  LV Mass    62.2 g/m²  Index:  Mmode    SPECTRAL DOPPLER ANALYSIS:  Aortic Valve:  AoV VMax:    0.72 m/s AoV Area, Vmax: 2.18 cm² Vmax Indx: 1.24 cm²/m²  AoV Pk Grad: 2.0 mmHg    LVOT Vmax: 0.49 m/s  LVOT VTI:  0.09 m  LVOT Diam: 2.01 cm    Aortic Insufficiency:  AI Half-time:  371 msec  AI Decel Rate: 1.50 m/s²    Tricuspid Valve and PA/RV Systolic Pressure: TR Max Velocity: 2.00 m/s RA   Pressure: 15 mmHg RVSP/PASP: 31.0 mmHg    Pulmonic Valve:  PV Max Velocity: 0.43 m/s PV Max P.7 mmHg PV Mean PG:       [id number] Dean Calzada MD, Electronically signed on 2019 at 2:46:38   PM              *** Final ***                    DEAN CALZADA MD  This document has been electronically signed. 2019  1:30PM             (19 @ 13:30)  12 Lead ECG:   Ventricular Rate 78 BPM    Atrial Rate 78 BPM    P-R Interval 140 ms    QRS Duration 96 ms    Q-T Interval 418 ms    QTC Calculation(Bezet) 476 ms    P Axis -15 degrees    R Axis 104 degrees    T Axis -35 degrees    Diagnosis Line Normal sinus rhythm  Rightward axis  Pulmonary disease pattern  Incomplete right bundle branch block  Abnormal QRS-T angle, consider primary T wave abnormality  Abnormal ECG    Confirmed by DEAN CALZADA MD ([id number]) on 2019 9:19:08 AM (19 @ 23:14)      MEDICATIONS  aspirin enteric coated 81  ATENolol  Tablet 25  chlorhexidine 4% Liquid 1  enalapril 10  enoxaparin Injectable 40  ergocalciferol 83958  mycophenolate mofetil 500  mycophenolate mofetil 750  pantoprazole    Tablet 40  predniSONE   Tablet 30  sodium chloride 0.9%. 1000  tacrolimus 4  tacrolimus 3  tacrolimus 0.5      ANTIBIOTICS:

## 2019-06-29 NOTE — DISCHARGE NOTE NURSING/CASE MANAGEMENT/SOCIAL WORK - NSDCDPATPORTLINK_GEN_ALL_CORE
You can access the [facility], offered by [facility], by registering with the following website: [email or web address]

## 2019-06-29 NOTE — CHART NOTE - NSCHARTNOTEFT_GEN_A_CORE
<<<RESIDENT DISCHARGE NOTE>>>     BROOKLYN CROOK  MRN-[id number]    VITAL SIGNS:  T(F): 96.6 (06-29-19 @ 05:55), Max: 96.9 (06-28-19 @ 12:56)  HR: 75 (06-29-19 @ 05:55)  BP: 95/61 (06-29-19 @ 05:55)  SpO2: 98% (06-28-19 @ 12:43)      PHYSICAL EXAMINATION:  General: NAD, A&O, 28yMale, lying in bed, easily arousable  Head: NC, AT  Eyes: EOMI, conjunctiva and sclera white  Chest/lung: CTAB, no wheezes or crackles, no accessory muscles used  Heart: RRR, no m/r/g  Abdomen: Soft, NTND, no guarding  Ext: No cyanosis, erythema, or edema. Tattoos on left arm.  Neurology: BLE 0/5 strength BUE full movement and 4/5 strength    TEST RESULTS:                        13.7   8.44  )-----------( 259      ( 29 Jun 2019 05:46 )             42.7       06-29    137  |  103  |  18  ----------------------------<  94  4.7   |  18  |  <0.5<L>    Ca    9.4      29 Jun 2019 05:46  Mg     2.1     06-29    TPro  5.8<L>  /  Alb  3.6  /  TBili  0.8  /  DBili  x   /  AST  38  /  ALT  36  /  AlkPhos  49  06-29      FINAL DISCHARGE INTERVIEW:  Resident(s) Present: (Name:_____Adrian________), RN Present: (Name:  ___Jocelyne________)    DISCHARGE MEDICATION RECONCILIATION  reviewed with Attending (Name:____Alma______)    DISPOSITION:   [ X ] Home,    [  ] Home with Visiting Nursing Services,   [    ]  SNF/ NH,    [   ] Acute Rehab (4A),   [   ] Other (Specify:_________)

## 2019-06-29 NOTE — PROGRESS NOTE ADULT - SUBJECTIVE AND OBJECTIVE BOX
BROOKLYN CROOK  28y Male    CHIEF COMPLAINT:    Patient is a 28y old  Male who presents with a chief complaint of generalized weakness, fever (29 Jun 2019 08:39)      INTERVAL HPI/OVERNIGHT EVENTS:    Patient seen and examined. Afebrile for more than 48 hrs. No cough. No sob. No abdominal pain. No urinary complaints. No N/V.     ROS: All other systems are negative.    Vital Signs:    T(F): 96.6 (06-29-19 @ 05:55), Max: 96.9 (06-28-19 @ 12:56)  HR: 75 (06-29-19 @ 05:55) (75 - 80)  BP: 95/61 (06-29-19 @ 05:55) (83/53 - 102/69)  RR: 18 (06-29-19 @ 05:55) (18 - 18)  SpO2: 98% (06-28-19 @ 12:43) (98% - 98%)  I&O's Summary    28 Jun 2019 07:01  -  29 Jun 2019 07:00  --------------------------------------------------------  IN: 410 mL / OUT: 150 mL / NET: 260 mL      Daily     Daily   CAPILLARY BLOOD GLUCOSE          PHYSICAL EXAM:    GENERAL:  NAD  SKIN: No rashes or lesions  HENT: Atraumatic Normocephalic. PERRL. Moist membranes.  NECK: Supple, No JVD. No lymphadenopathy.  PULMONARY: CTA B/L. No wheezing. No rales  CVS: Normal S1, S2. Rate and Rhythm are regular. No murmurs.  ABDOMEN/GI: Soft, Nontender, Nondistended; BS present  EXTREMITIES: Peripheral pulses intact. No edema B/L LE.  NEUROLOGIC:  No motor or sensory deficit.  PSYCH: Alert & oriented x 3    Consultant(s) Notes Reviewed:  [x ] YES  [ ] NO  Care Discussed with Consultants/Other Providers [ x] YES  [ ] NO    EKG reviewed  Telemetry reviewed    LABS:                        13.7   8.44  )-----------( 259      ( 29 Jun 2019 05:46 )             42.7     06-29    137  |  103  |  18  ----------------------------<  94  4.7   |  18  |  <0.5<L>    Ca    9.4      29 Jun 2019 05:46  Mg     2.1     06-29    TPro  5.8<L>  /  Alb  3.6  /  TBili  0.8  /  DBili  x   /  AST  38  /  ALT  36  /  AlkPhos  49  06-29      Serum Pro-Brain Natriuretic Peptide: 3464 pg/mL (06-27-19 @ 04:00)    Trop 0.48, CKMB 28.8, , 06-29-19 @ 05:46  Trop 0.41, CKMB 31.1, CK 1187, 06-28-19 @ 05:16  Trop 0.39, CKMB 45.6, CK 1826, 06-27-19 @ 11:32  Trop --, CKMB --, CK 2339, 06-27-19 @ 06:36  Trop 0.47, CKMB --, CK --, 06-27-19 @ 04:00      Culture - Urine (collected 27 Jun 2019 22:20)  Source: .Urine Clean Catch (Midstream)  Final Report (28 Jun 2019 20:53):    <10,000 CFU/mL Normal Urogenital Brenda    Culture - Blood (collected 27 Jun 2019 06:36)  Source: .Blood Blood-Peripheral  Gram Stain (28 Jun 2019 17:56):    Growth in aerobic bottle: Gram Positive Cocci in Clusters  Preliminary Report (28 Jun 2019 17:56):    Growth in aerobic bottle: Gram Positive Cocci in Clusters    "Due to technical problems, Proteus sp. will Not be reported as part of    the BCID panel until further notice"    ***Blood Panel PCR results on this specimen are available    approximately 3 hours after the Gram stain result.***    Gram stain, PCR, and/or culture results may not always    correspond due to difference in methodologies.    ************************************************************    This PCR assay was performed using [employer].    The following targets are tested for: Enterococcus,    vancomycin resistant enterococci, Listeria monocytogenes,    coagulase negative staphylococci, S. aureus,    methicillin resistant S. aureus, Streptococcus agalactiae    (Group B), S. pneumoniae, S.pyogenes (Group A),    Acinetobacter baumannii, Enterobacter cloacae, E. coli,    Klebsiella oxytoca, K. pneumoniae, Proteus sp.,    Serratia marcescens, Haemophilus influenzae,    Neisseria meningitidis, Pseudomonas aeruginosa, Candida    albicans, C. glabrata, C krusei, C parapsilosis,    C. tropicalis and the KPC resistance gene.  Organism: Blood Culture PCR (28 Jun 2019 19:16)  Organism: Blood Culture PCR (28 Jun 2019 19:16)    Culture - Blood (collected 27 Jun 2019 06:36)  Source: .Blood Blood-Peripheral  Preliminary Report (28 Jun 2019 14:01):    No growth to date.        RADIOLOGY & ADDITIONAL TESTS:      Imaging or report Personally Reviewed:  [ ] YES  [ ] NO    Medications:  Standing  aspirin enteric coated 81 milliGRAM(s) Oral daily  ATENolol  Tablet 25 milliGRAM(s) Oral daily  chlorhexidine 4% Liquid 1 Application(s) Topical <User Schedule>  enalapril 10 milliGRAM(s) Oral two times a day  enoxaparin Injectable 40 milliGRAM(s) SubCutaneous at bedtime  ergocalciferol 47454 Unit(s) Oral every week  mycophenolate mofetil 500 milliGRAM(s) Oral <User Schedule>  mycophenolate mofetil 750 milliGRAM(s) Oral at bedtime  pantoprazole    Tablet 40 milliGRAM(s) Oral before breakfast  predniSONE   Tablet 30 milliGRAM(s) Oral daily  sodium chloride 0.9%. 1000 milliLiter(s) IV Continuous <Continuous>  tacrolimus 4 milliGRAM(s) Oral <User Schedule>  tacrolimus 3 milliGRAM(s) Oral at bedtime  tacrolimus 0.5 milliGRAM(s) Oral at bedtime    PRN Meds  acetaminophen   Tablet .. 500 milliGRAM(s) Oral every 8 hours PRN  aluminum hydroxide/magnesium hydroxide/simethicone Suspension 30 milliLiter(s) Oral every 6 hours PRN      Case discussed with resident    Care discussed with pt/family

## 2019-06-29 NOTE — PROGRESS NOTE ADULT - REASON FOR ADMISSION
generalized weakness, fever

## 2019-06-29 NOTE — PROGRESS NOTE ADULT - ASSESSMENT
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

## 2019-06-29 NOTE — PROGRESS NOTE ADULT - ASSESSMENT
A 28 years old male presented to the ED with intermittent fever, generalized weakness and malaise for the last two to three weeks. Also C/O feeling tired and having no energy. Pt also had diarrhea which now has resolved.     Troponin: 0.47 --> 0.39 --> 0.41  BNP: 3464  EKG: NSR @ 73/min. RAD, IRBBB, NS ST, T changes. (Interpreted by me.)  CXR: NAPD  RUQ US: GB sludge. Extensive GB wall thickening.  ECHO: NLVF. EF 64%      1. Fever / Generalized weakness  2. S/P Heart transplant  3. Vincent Muscular Dystrophy  4. Depression    PLAN:    . No fever since admission  . Two blood cxs grew coag negative staph which was contaminant. Repeat blood cx is negative.   . Urine cx is negative  . CMV PCR is negative  . ID F/U noted.   . Case was discussed with his transplant doctor at [place]. He cleared the pt to D/C.  . Cardiology eval appreciated. Care D/W the cardiologist Dr. Mcgovern. No further cardiac W/U  . Tacrolimus level is 20.5. He is advised to F/U with his transplant team.   . Cont his home meds  . Pt's elevated CK, Troponin, AST and ALT are likely due to muscle dystrophy.     * Med rec reviewed. Will cont all his home meds except antidepressant. Plan of care D/W the pt. Time spent 42 minutes.

## 2019-07-01 ENCOUNTER — OUTPATIENT (OUTPATIENT)
Dept: OUTPATIENT SERVICES | Facility: HOSPITAL | Age: 29
LOS: 1 days | End: 2019-07-01
Payer: MEDICAID

## 2019-07-01 DIAGNOSIS — Z94.1 HEART TRANSPLANT STATUS: Chronic | ICD-10-CM

## 2019-07-01 PROCEDURE — G9001: CPT

## 2019-07-02 LAB
CULTURE RESULTS: SIGNIFICANT CHANGE UP
SPECIMEN SOURCE: SIGNIFICANT CHANGE UP

## 2019-07-15 DIAGNOSIS — Z71.89 OTHER SPECIFIED COUNSELING: ICD-10-CM

## 2019-07-15 PROBLEM — F32.9 MAJOR DEPRESSIVE DISORDER, SINGLE EPISODE, UNSPECIFIED: Chronic | Status: ACTIVE | Noted: 2019-06-27

## 2024-03-17 NOTE — H&P ADULT - NSHPSOCIALHISTORY_GEN_ALL_CORE
Lives alone. Uses powerchair. One alcoholic drink per week. Denies any tobacco use or history. Uses marijuana one time per week.
Him/He